# Patient Record
Sex: MALE | Race: WHITE | Employment: FULL TIME | URBAN - METROPOLITAN AREA
[De-identification: names, ages, dates, MRNs, and addresses within clinical notes are randomized per-mention and may not be internally consistent; named-entity substitution may affect disease eponyms.]

---

## 2017-10-27 ENCOUNTER — HOSPITAL ENCOUNTER (OUTPATIENT)
Age: 57
Discharge: HOME | End: 2017-10-27
Payer: COMMERCIAL

## 2017-10-27 LAB
ABSOLUTE EOS #: 0.1 K/UL (ref 0–0.4)
ABSOLUTE IMMATURE GRANULOCYTE: NORMAL K/UL (ref 0–0.3)
ABSOLUTE LYMPH #: 1.3 K/UL (ref 1–4.8)
ABSOLUTE MONO #: 0.6 K/UL (ref 0.1–1.3)
ALBUMIN SERPL-MCNC: 4.8 G/DL (ref 3.5–5.2)
ALBUMIN/GLOBULIN RATIO: NORMAL (ref 1–2.5)
ALP BLD-CCNC: 59 U/L (ref 40–129)
ALT SERPL-CCNC: 34 U/L (ref 5–41)
ANION GAP SERPL CALCULATED.3IONS-SCNC: 13 MMOL/L (ref 9–17)
AST SERPL-CCNC: 26 U/L
BASOPHILS # BLD: 0 %
BASOPHILS ABSOLUTE: 0 K/UL (ref 0–0.2)
BILIRUB SERPL-MCNC: 0.46 MG/DL (ref 0.3–1.2)
BUN BLDV-MCNC: 17 MG/DL (ref 6–20)
BUN/CREAT BLD: NORMAL (ref 9–20)
CALCIUM SERPL-MCNC: 9.1 MG/DL (ref 8.6–10.4)
CARCINOEMBRYONIC ANTIGEN: 1.3 NG/ML
CHLORIDE BLD-SCNC: 100 MMOL/L (ref 98–107)
CO2: 29 MMOL/L (ref 20–31)
CREAT SERPL-MCNC: 1.11 MG/DL (ref 0.7–1.2)
DIFFERENTIAL TYPE: NORMAL
EOSINOPHILS RELATIVE PERCENT: 1 %
GFR AFRICAN AMERICAN: >60 ML/MIN
GFR NON-AFRICAN AMERICAN: >60 ML/MIN
GFR SERPL CREATININE-BSD FRML MDRD: NORMAL ML/MIN/{1.73_M2}
GFR SERPL CREATININE-BSD FRML MDRD: NORMAL ML/MIN/{1.73_M2}
GLUCOSE BLD-MCNC: 96 MG/DL (ref 70–99)
HCT VFR BLD CALC: 44.1 % (ref 41–53)
HEMOGLOBIN: 15.1 G/DL (ref 13.5–17.5)
IMMATURE GRANULOCYTES: NORMAL %
LYMPHOCYTES # BLD: 18 %
MCH RBC QN AUTO: 30.2 PG (ref 26–34)
MCHC RBC AUTO-ENTMCNC: 34.3 G/DL (ref 31–37)
MCV RBC AUTO: 88.1 FL (ref 80–100)
MONOCYTES # BLD: 8 %
PDW BLD-RTO: 13.4 % (ref 11.5–14.9)
PLATELET # BLD: 232 K/UL (ref 150–450)
PLATELET ESTIMATE: NORMAL
PMV BLD AUTO: 10.2 FL (ref 6–12)
POTASSIUM SERPL-SCNC: 4.1 MMOL/L (ref 3.7–5.3)
RBC # BLD: 5.01 M/UL (ref 4.5–5.9)
RBC # BLD: NORMAL 10*6/UL
SEG NEUTROPHILS: 73 %
SEGMENTED NEUTROPHILS ABSOLUTE COUNT: 5.2 K/UL (ref 1.3–9.1)
SODIUM BLD-SCNC: 142 MMOL/L (ref 135–144)
THYROXINE, FREE: 1.17 NG/DL (ref 0.93–1.7)
TOTAL PROTEIN: 7.8 G/DL (ref 6.4–8.3)
TSH SERPL DL<=0.05 MIU/L-ACNC: 1.17 MIU/L (ref 0.3–5)
WBC # BLD: 7.2 K/UL (ref 3.5–11)
WBC # BLD: NORMAL 10*3/UL

## 2017-10-27 PROCEDURE — 80053 COMPREHEN METABOLIC PANEL: CPT

## 2017-10-27 PROCEDURE — 85025 COMPLETE CBC W/AUTO DIFF WBC: CPT

## 2017-10-27 PROCEDURE — 82378 CARCINOEMBRYONIC ANTIGEN: CPT

## 2017-10-27 PROCEDURE — 84443 ASSAY THYROID STIM HORMONE: CPT

## 2017-10-27 PROCEDURE — 36415 COLL VENOUS BLD VENIPUNCTURE: CPT

## 2017-10-27 PROCEDURE — 84439 ASSAY OF FREE THYROXINE: CPT

## 2018-07-17 ENCOUNTER — OFFICE VISIT (OUTPATIENT)
Dept: BARIATRICS/WEIGHT MGMT | Age: 58
End: 2018-07-17
Payer: COMMERCIAL

## 2018-07-17 VITALS
SYSTOLIC BLOOD PRESSURE: 134 MMHG | BODY MASS INDEX: 36.16 KG/M2 | DIASTOLIC BLOOD PRESSURE: 78 MMHG | RESPIRATION RATE: 24 BRPM | HEIGHT: 71 IN | WEIGHT: 258.3 LBS | HEART RATE: 76 BPM

## 2018-07-17 DIAGNOSIS — E11.69 DIABETES MELLITUS TYPE 2 IN OBESE (HCC): Primary | ICD-10-CM

## 2018-07-17 DIAGNOSIS — M19.90 ARTHRITIS: ICD-10-CM

## 2018-07-17 DIAGNOSIS — Z98.61 CAD S/P PERCUTANEOUS CORONARY ANGIOPLASTY: Chronic | ICD-10-CM

## 2018-07-17 DIAGNOSIS — E78.5 HYPERLIPIDEMIA, UNSPECIFIED HYPERLIPIDEMIA TYPE: Chronic | ICD-10-CM

## 2018-07-17 DIAGNOSIS — E66.9 DIABETES MELLITUS TYPE 2 IN OBESE (HCC): Primary | ICD-10-CM

## 2018-07-17 DIAGNOSIS — Z85.038 HISTORY OF COLON CANCER: ICD-10-CM

## 2018-07-17 DIAGNOSIS — E66.9 OBESITY (BMI 30-39.9): ICD-10-CM

## 2018-07-17 DIAGNOSIS — I10 ESSENTIAL HYPERTENSION: Chronic | ICD-10-CM

## 2018-07-17 DIAGNOSIS — I25.10 CAD S/P PERCUTANEOUS CORONARY ANGIOPLASTY: Chronic | ICD-10-CM

## 2018-07-17 PROBLEM — I25.119 CORONARY ARTERY DISEASE INVOLVING NATIVE CORONARY ARTERY OF NATIVE HEART WITH ANGINA PECTORIS (HCC): Status: ACTIVE | Noted: 2018-07-17

## 2018-07-17 PROCEDURE — 99204 OFFICE O/P NEW MOD 45 MIN: CPT | Performed by: NURSE PRACTITIONER

## 2018-07-17 RX ORDER — LORATADINE 10 MG/1
10 CAPSULE, LIQUID FILLED ORAL DAILY
COMMUNITY

## 2018-07-17 NOTE — PROGRESS NOTES
Uric Acid    EKG 12 Lead   2. Essential hypertension  CBC Auto Differential    Comprehensive Metabolic Panel    Hemoglobin A1C    Lipid Panel    TSH without Reflex    Uric Acid    EKG 12 Lead   3. History of colon cancer  CBC Auto Differential    Comprehensive Metabolic Panel    Hemoglobin A1C    Lipid Panel    TSH without Reflex    Uric Acid    EKG 12 Lead   4. Arthritis  CBC Auto Differential    Comprehensive Metabolic Panel    Hemoglobin A1C    Lipid Panel    TSH without Reflex    Uric Acid    EKG 12 Lead   5. Obesity (BMI 30-39. 9)  CBC Auto Differential    Comprehensive Metabolic Panel    Hemoglobin A1C    Lipid Panel    TSH without Reflex    Uric Acid    EKG 12 Lead   6. CAD S/P percutaneous coronary angioplasty  CBC Auto Differential    Comprehensive Metabolic Panel    Hemoglobin A1C    Lipid Panel    TSH without Reflex    Uric Acid    EKG 12 Lead   7. Hyperlipidemia, unspecified hyperlipidemia type  CBC Auto Differential    Comprehensive Metabolic Panel    Hemoglobin A1C    Lipid Panel    TSH without Reflex    Uric Acid    EKG 12 Lead       Plan:      Patient would like to do Decision Free 3+2 diet. Type of shake is . Has mild lactose intolerance and would like to try Lactaid with shakes first and if unsuccessful, will switch to 70 Plus Shakes. Advised patient on consistently getting in minimum food prescription and practicing \"More is Better\" to stay full. Instructed patient to order minimum food prescription weekly. Advised on hydration of 64oz of non-caloric fluid minimum daily. Encouraged plain water and caffeine only in moderation if at all. Avoid alcohol. Level of medical supervision is High Level. Follow-up appts and class schedule reviewed. Consent signed. [x] Order EKG per protocol. [x] Order baseline lab work per protocol. [x] Diabetic teaching done. Low blood sugar protocol reviewed with pt. Follow up:  Return in about 2 weeks (around 7/31/2018).     This encounters orders:  Orders Placed This Encounter   Procedures    CBC Auto Differential     Standing Status:   Future     Standing Expiration Date:   7/17/2019    Comprehensive Metabolic Panel     Standing Status:   Future     Standing Expiration Date:   7/17/2019    Hemoglobin A1C     Standing Status:   Future     Standing Expiration Date:   7/17/2019    Lipid Panel     Standing Status:   Future     Standing Expiration Date:   7/17/2019     Order Specific Question:   Is Patient Fasting?/# of Hours     Answer:   15    TSH without Reflex     Standing Status:   Future     Standing Expiration Date:   7/17/2019    Uric Acid     Standing Status:   Future     Standing Expiration Date:   7/17/2019    EKG 12 Lead     Standing Status:   Future     Standing Expiration Date:   7/17/2019     Order Specific Question:   Reason for Exam?     Answer:   Pre-op       This encounters prescriptions:  No orders of the defined types were placed in this encounter.       Electronically signed by:  Jed Stone CNP

## 2018-07-18 ENCOUNTER — HOSPITAL ENCOUNTER (OUTPATIENT)
Age: 58
Discharge: HOME | End: 2018-07-18
Payer: COMMERCIAL

## 2018-07-18 ENCOUNTER — TELEPHONE (OUTPATIENT)
Dept: BARIATRICS/WEIGHT MGMT | Age: 58
End: 2018-07-18

## 2018-07-18 DIAGNOSIS — I25.10 CAD S/P PERCUTANEOUS CORONARY ANGIOPLASTY: Chronic | ICD-10-CM

## 2018-07-18 DIAGNOSIS — Z85.038 HISTORY OF COLON CANCER: ICD-10-CM

## 2018-07-18 DIAGNOSIS — E11.69 DIABETES MELLITUS TYPE 2 IN OBESE (HCC): ICD-10-CM

## 2018-07-18 DIAGNOSIS — E66.9 OBESITY (BMI 30-39.9): ICD-10-CM

## 2018-07-18 DIAGNOSIS — E66.9 DIABETES MELLITUS TYPE 2 IN OBESE (HCC): ICD-10-CM

## 2018-07-18 DIAGNOSIS — M19.90 ARTHRITIS: ICD-10-CM

## 2018-07-18 DIAGNOSIS — E78.5 HYPERLIPIDEMIA, UNSPECIFIED HYPERLIPIDEMIA TYPE: Chronic | ICD-10-CM

## 2018-07-18 DIAGNOSIS — I10 ESSENTIAL HYPERTENSION: Chronic | ICD-10-CM

## 2018-07-18 DIAGNOSIS — Z98.61 CAD S/P PERCUTANEOUS CORONARY ANGIOPLASTY: Chronic | ICD-10-CM

## 2018-07-18 LAB
ABSOLUTE EOS #: 0.3 K/UL (ref 0–0.4)
ABSOLUTE IMMATURE GRANULOCYTE: ABNORMAL K/UL (ref 0–0.3)
ABSOLUTE LYMPH #: 1.6 K/UL (ref 1–4.8)
ABSOLUTE MONO #: 0.6 K/UL (ref 0.1–1.3)
ALBUMIN SERPL-MCNC: 4.2 G/DL (ref 3.5–5.2)
ALBUMIN/GLOBULIN RATIO: ABNORMAL (ref 1–2.5)
ALP BLD-CCNC: 54 U/L (ref 40–129)
ALT SERPL-CCNC: 47 U/L (ref 5–41)
ANION GAP SERPL CALCULATED.3IONS-SCNC: 11 MMOL/L (ref 9–17)
AST SERPL-CCNC: 28 U/L
BASOPHILS # BLD: 1 % (ref 0–2)
BASOPHILS ABSOLUTE: 0 K/UL (ref 0–0.2)
BILIRUB SERPL-MCNC: 0.31 MG/DL (ref 0.3–1.2)
BUN BLDV-MCNC: 19 MG/DL (ref 6–20)
BUN/CREAT BLD: ABNORMAL (ref 9–20)
CALCIUM SERPL-MCNC: 8.7 MG/DL (ref 8.6–10.4)
CHLORIDE BLD-SCNC: 100 MMOL/L (ref 98–107)
CHOLESTEROL/HDL RATIO: 3.4
CHOLESTEROL: 161 MG/DL
CO2: 29 MMOL/L (ref 20–31)
CREAT SERPL-MCNC: 1.06 MG/DL (ref 0.7–1.2)
DIFFERENTIAL TYPE: ABNORMAL
EKG ATRIAL RATE: 53 BPM
EKG P AXIS: 43 DEGREES
EKG P-R INTERVAL: 174 MS
EKG Q-T INTERVAL: 452 MS
EKG QRS DURATION: 98 MS
EKG QTC CALCULATION (BAZETT): 424 MS
EKG R AXIS: 34 DEGREES
EKG T AXIS: 40 DEGREES
EKG VENTRICULAR RATE: 53 BPM
EOSINOPHILS RELATIVE PERCENT: 5 % (ref 0–4)
ESTIMATED AVERAGE GLUCOSE: 146 MG/DL
GFR AFRICAN AMERICAN: >60 ML/MIN
GFR NON-AFRICAN AMERICAN: >60 ML/MIN
GFR SERPL CREATININE-BSD FRML MDRD: ABNORMAL ML/MIN/{1.73_M2}
GFR SERPL CREATININE-BSD FRML MDRD: ABNORMAL ML/MIN/{1.73_M2}
GLUCOSE BLD-MCNC: 149 MG/DL (ref 70–99)
HBA1C MFR BLD: 6.7 % (ref 4–6)
HCT VFR BLD CALC: 43 % (ref 41–53)
HDLC SERPL-MCNC: 47 MG/DL
HEMOGLOBIN: 14.7 G/DL (ref 13.5–17.5)
IMMATURE GRANULOCYTES: ABNORMAL %
LDL CHOLESTEROL: 95 MG/DL (ref 0–130)
LYMPHOCYTES # BLD: 27 % (ref 24–44)
MCH RBC QN AUTO: 30.2 PG (ref 26–34)
MCHC RBC AUTO-ENTMCNC: 34.2 G/DL (ref 31–37)
MCV RBC AUTO: 88.5 FL (ref 80–100)
MONOCYTES # BLD: 10 % (ref 1–7)
NRBC AUTOMATED: ABNORMAL PER 100 WBC
PDW BLD-RTO: 13.6 % (ref 11.5–14.9)
PLATELET # BLD: 216 K/UL (ref 150–450)
PLATELET ESTIMATE: ABNORMAL
PMV BLD AUTO: 9.4 FL (ref 6–12)
POTASSIUM SERPL-SCNC: 4.5 MMOL/L (ref 3.7–5.3)
RBC # BLD: 4.87 M/UL (ref 4.5–5.9)
RBC # BLD: ABNORMAL 10*6/UL
SEG NEUTROPHILS: 57 % (ref 36–66)
SEGMENTED NEUTROPHILS ABSOLUTE COUNT: 3.5 K/UL (ref 1.3–9.1)
SODIUM BLD-SCNC: 140 MMOL/L (ref 135–144)
TOTAL PROTEIN: 6.8 G/DL (ref 6.4–8.3)
TRIGL SERPL-MCNC: 96 MG/DL
TSH SERPL DL<=0.05 MIU/L-ACNC: 2.16 MIU/L (ref 0.3–5)
URIC ACID: 6.8 MG/DL (ref 3.4–7)
VLDLC SERPL CALC-MCNC: NORMAL MG/DL (ref 1–30)
WBC # BLD: 5.9 K/UL (ref 3.5–11)
WBC # BLD: ABNORMAL 10*3/UL

## 2018-07-18 PROCEDURE — 84550 ASSAY OF BLOOD/URIC ACID: CPT

## 2018-07-18 PROCEDURE — 93005 ELECTROCARDIOGRAM TRACING: CPT

## 2018-07-18 PROCEDURE — 85025 COMPLETE CBC W/AUTO DIFF WBC: CPT

## 2018-07-18 PROCEDURE — 83036 HEMOGLOBIN GLYCOSYLATED A1C: CPT

## 2018-07-18 PROCEDURE — 80061 LIPID PANEL: CPT

## 2018-07-18 PROCEDURE — 36415 COLL VENOUS BLD VENIPUNCTURE: CPT

## 2018-07-18 PROCEDURE — 84443 ASSAY THYROID STIM HORMONE: CPT

## 2018-07-18 PROCEDURE — 80053 COMPREHEN METABOLIC PANEL: CPT

## 2018-08-01 ENCOUNTER — OFFICE VISIT (OUTPATIENT)
Dept: BARIATRICS/WEIGHT MGMT | Age: 58
End: 2018-08-01
Payer: COMMERCIAL

## 2018-08-01 VITALS
SYSTOLIC BLOOD PRESSURE: 126 MMHG | RESPIRATION RATE: 22 BRPM | BODY MASS INDEX: 33.88 KG/M2 | DIASTOLIC BLOOD PRESSURE: 74 MMHG | WEIGHT: 242 LBS | HEART RATE: 72 BPM | HEIGHT: 71 IN

## 2018-08-01 DIAGNOSIS — E78.5 HYPERLIPIDEMIA, UNSPECIFIED HYPERLIPIDEMIA TYPE: Chronic | ICD-10-CM

## 2018-08-01 DIAGNOSIS — M19.90 ARTHRITIS: ICD-10-CM

## 2018-08-01 DIAGNOSIS — Z98.61 CAD S/P PERCUTANEOUS CORONARY ANGIOPLASTY: Chronic | ICD-10-CM

## 2018-08-01 DIAGNOSIS — E11.69 DIABETES MELLITUS TYPE 2 IN OBESE (HCC): Primary | ICD-10-CM

## 2018-08-01 DIAGNOSIS — E66.9 OBESITY (BMI 30-39.9): ICD-10-CM

## 2018-08-01 DIAGNOSIS — E66.9 DIABETES MELLITUS TYPE 2 IN OBESE (HCC): Primary | ICD-10-CM

## 2018-08-01 DIAGNOSIS — I25.10 CAD S/P PERCUTANEOUS CORONARY ANGIOPLASTY: Chronic | ICD-10-CM

## 2018-08-01 DIAGNOSIS — I10 ESSENTIAL HYPERTENSION: Chronic | ICD-10-CM

## 2018-08-01 PROCEDURE — 99213 OFFICE O/P EST LOW 20 MIN: CPT | Performed by: NURSE PRACTITIONER

## 2018-08-01 RX ORDER — POTASSIUM CHLORIDE 750 MG/1
10 TABLET, EXTENDED RELEASE ORAL DAILY
Qty: 30 TABLET | Refills: 0 | Status: ON HOLD | OUTPATIENT
Start: 2018-08-01 | End: 2018-10-18 | Stop reason: ALTCHOICE

## 2018-08-01 NOTE — PROGRESS NOTES
Medically Supervised Weight Loss Follow-up Progress Note      Subjective     Patient is here for weight management program to follow-up for the chronic conditions of Type 2 DM, CAD, HLD, HTN, Arthritis . Patient was on Decision Free 3+2 diet plan, but since he was bothered by lactose intolerance, he switched himself to 70 Plus and purchased these shakes today. Consistently getting in minimum food script and fluids and HMR multivitamin daily. Total weight loss to date is 16 lbs. He had some dizziness, so he thought it might be from metformin and took himself off of it. Continues with antihypertensives. Allergies: Allergies   Allergen Reactions    Iodine Rash     SEVERE ITCHING AND RASH       Past Medical History:     Past Medical History:   Diagnosis Date    Arthritis     CAD (coronary artery disease)     Diabetes mellitus ([facility] [address]     H/O colon cancer, stage I     Hyperlipidemia     Hyperplastic polyp of intestine     Hypertension     Pneumonia     Polyp of colon, villous adenoma     WITH HIGH GRADE DYSPLASIA     Tubular adenoma    . Past Surgical History:  Past Surgical History:   Procedure Laterality Date    COLECTOMY  4/21/15    LOWER ANTERIOR RESECTION, INTRAOP.  COLONOSCOPY, DIVERTING LOOP COLOSTOMY    COLONOSCOPY  3-24-15    VILLOUS ADENOMA WITH FOCAL HIGH GRADE DYSPLASIA, TUBULAR ADENOMA AND HYPERPLASTIC POLYP     COLONOSCOPY  6/23/15    BENIGN SMALL INTESTINE AND CONTIGUOUS SKIN, CONSISTENT WITH ILEOTOMY     COLONOSCOPY  03/17/16    DIVERTICULOSIS, HEMORRHOIDS     CORONARY ANGIOPLASTY WITH STENT PLACEMENT  2004 2009 2013     TOTAL OF 10 STENTS    OTHER SURGICAL HISTORY Left 2 YRS AGO    SHOULDER SURGERY    OTHER SURGICAL HISTORY  6/30/15    ileostomy reversal    SIGMOIDOSCOPY  4/16/15    tatooing    TESTICLE SURGERY Right     TIBIA FRACTURE SURGERY Right 1975    & FIB    TONSILLECTOMY      VASECTOMY  25YRS AGO       Family History:  Family History   Problem Relation Age of Onset    Heart Attack Father     Hypertension Father     High Cholesterol Father     Hypertension Brother     Heart Attack Brother     Cancer Brother         skin cancer    Heart Attack Brother     Heart Attack Paternal Grandfather     Heart Attack Maternal Grandfather        Social History:  Social History     Social History    Marital status:      Spouse name: N/A    Number of children: N/A    Years of education: N/A     Occupational History    Not on file. Social History Main Topics    Smoking status: Former Smoker     Quit date: 4/17/2005    Smokeless tobacco: Never Used    Alcohol use Yes      Comment: RARE    Drug use: No    Sexual activity: Not on file     Other Topics Concern    Not on file     Social History Narrative    No narrative on file       Current Medications:  Current Outpatient Prescriptions   Medication Sig Dispense Refill    potassium chloride (KLOR-CON M) 10 MEQ extended release tablet Take 1 tablet by mouth daily 30 tablet 0    loratadine (CLARITIN) 10 MG capsule Take 10 mg by mouth daily      ZETIA 10 MG tablet       NITROSTAT 0.4 MG SL tablet       rosuvastatin (CRESTOR) 40 MG tablet Take 40 mg by mouth every evening.  metoprolol (TOPROL-XL) 50 MG XL tablet Take 50 mg by mouth daily.  lisinopril (PRINIVIL;ZESTRIL) 5 MG tablet Take 5 mg by mouth daily.  aspirin 81 MG tablet Take 81 mg by mouth daily.  metFORMIN (GLUCOPHAGE) 500 MG tablet TAKE 1 TABLET BY MOUTH TWO TIMES A DAY  1     No current facility-administered medications for this visit. Vital Signs:  /74 (Site: Right Arm, Position: Sitting, Cuff Size: Large Adult)   Pulse 72   Resp 22   Ht 5' 10.98\" (1.803 m)   Wt 242 lb (109.8 kg)   BMI 33.77 kg/m²     BMI/Height/Weight:  Body mass index is 33.77 kg/m². Review of Systems - Review of systems was performed. All were negative unless otherwise documented in HPI.     Constitutional: Negative for fever, chills and diaphoresis. HENT: Negative for hearing loss and trouble swallowing. Eyes: Negative for photophobia and visual disturbance. Respiratory: Negative for cough, shortness of breath and wheezing. Cardiovascular: Negative for chest pain and palpitations. Gastrointestinal: Negative for nausea, vomiting, abdominal pain, diarrhea, constipation, blood in stool and abdominal distention. Endocrine: Negative for polydipsia, polyphagia and polyuria. Genitourinary: Negative for dysuria, frequency, hematuria and difficulty urinating. Musculoskeletal: Negative for myalgias, joint swelling. Skin: Negative for pallor and rash. Neurological: Negative for dizziness, tremors, light-headedness and headaches. Psychiatric/Behavioral: Negative for sleep disturbance and dysphoric mood. Objective:      Physical Exam   Vital signs reviewed. General: Well-developed and well-nourished. No acute distress. Skin: Warm, dry and intact. HEENT: Normocephalic. EOMs intact. Conjunctivae normal. Neck supple. Cardiovascular: Normal rate, regular rhythm. No murmur. Pulmonary/Chest: Normal effort. Lungs clear to auscultation. No rales, rhonchi or wheezing. Abdominal: Positive bowel sounds. Soft, nontender. Nondistended. Musculoskeletal: Movement x4. No edema. Neurological: Gait normal. Alert and oriented to person, place, and time. Psychiatric: Normal mood and affect. Speech and behavior normal. Judgment and thought content normal. Cognition and memory intact. Assessment:       Diagnosis Orders   1. Diabetes mellitus type 2 in obese [facility])  Comprehensive Metabolic Panel    potassium chloride (KLOR-CON M) 10 MEQ extended release tablet   2. Hyperlipidemia, unspecified hyperlipidemia type  Comprehensive Metabolic Panel    potassium chloride (KLOR-CON M) 10 MEQ extended release tablet   3. Essential hypertension  Comprehensive Metabolic Panel    potassium chloride (KLOR-CON M) 10 MEQ extended release tablet   4. Arthritis  Comprehensive Metabolic Panel    potassium chloride (KLOR-CON M) 10 MEQ extended release tablet   5. Obesity (BMI 30-39. 9)  Comprehensive Metabolic Panel    potassium chloride (KLOR-CON M) 10 MEQ extended release tablet   6. CAD S/P percutaneous coronary angioplasty         Plan:      Vital signs reviewed      Plan:  May switch to 70 Plus. Reviewed new minimum prescription and potassium supplement discussed and prescribed. Lab work per [facility] protocol. Advised patient to be fasting since he does not test his blood sugars and took himself off metformin. Return to clinic as scheduled per [facility] protocol. Follow up:  Return in about 6 days (around 8/7/2018).     This encounters orders:  Orders Placed This Encounter   Procedures    Comprehensive Metabolic Panel     Standing Status:   Future     Standing Expiration Date:   8/1/2019       This encounters prescriptions:  Orders Placed This Encounter   Medications    potassium chloride (KLOR-CON M) 10 MEQ extended release tablet     Sig: Take 1 tablet by mouth daily     Dispense:  30 tablet     Refill:  0       Electronically signed by:  Radha Cooley CNP

## 2018-08-03 ENCOUNTER — HOSPITAL ENCOUNTER (OUTPATIENT)
Age: 58
Discharge: HOME | End: 2018-08-03
Payer: COMMERCIAL

## 2018-08-03 DIAGNOSIS — M19.90 ARTHRITIS: ICD-10-CM

## 2018-08-03 DIAGNOSIS — E78.5 HYPERLIPIDEMIA, UNSPECIFIED HYPERLIPIDEMIA TYPE: Chronic | ICD-10-CM

## 2018-08-03 DIAGNOSIS — I10 ESSENTIAL HYPERTENSION: Chronic | ICD-10-CM

## 2018-08-03 DIAGNOSIS — E66.9 DIABETES MELLITUS TYPE 2 IN OBESE (HCC): ICD-10-CM

## 2018-08-03 DIAGNOSIS — E11.69 DIABETES MELLITUS TYPE 2 IN OBESE (HCC): ICD-10-CM

## 2018-08-03 DIAGNOSIS — E66.9 OBESITY (BMI 30-39.9): ICD-10-CM

## 2018-08-03 LAB
ALBUMIN SERPL-MCNC: 4.3 G/DL (ref 3.5–5.2)
ALBUMIN/GLOBULIN RATIO: ABNORMAL (ref 1–2.5)
ALP BLD-CCNC: 62 U/L (ref 40–129)
ALT SERPL-CCNC: 39 U/L (ref 5–41)
ANION GAP SERPL CALCULATED.3IONS-SCNC: 12 MMOL/L (ref 9–17)
AST SERPL-CCNC: 32 U/L
BILIRUB SERPL-MCNC: 0.44 MG/DL (ref 0.3–1.2)
BUN BLDV-MCNC: 16 MG/DL (ref 6–20)
BUN/CREAT BLD: ABNORMAL (ref 9–20)
CALCIUM SERPL-MCNC: 9.1 MG/DL (ref 8.6–10.4)
CHLORIDE BLD-SCNC: 102 MMOL/L (ref 98–107)
CO2: 27 MMOL/L (ref 20–31)
CREAT SERPL-MCNC: 1.1 MG/DL (ref 0.7–1.2)
GFR AFRICAN AMERICAN: >60 ML/MIN
GFR NON-AFRICAN AMERICAN: >60 ML/MIN
GFR SERPL CREATININE-BSD FRML MDRD: ABNORMAL ML/MIN/{1.73_M2}
GFR SERPL CREATININE-BSD FRML MDRD: ABNORMAL ML/MIN/{1.73_M2}
GLUCOSE BLD-MCNC: 121 MG/DL (ref 70–99)
POTASSIUM SERPL-SCNC: 4.3 MMOL/L (ref 3.7–5.3)
SODIUM BLD-SCNC: 141 MMOL/L (ref 135–144)
TOTAL PROTEIN: 7.2 G/DL (ref 6.4–8.3)

## 2018-08-03 PROCEDURE — 36415 COLL VENOUS BLD VENIPUNCTURE: CPT

## 2018-08-03 PROCEDURE — 80053 COMPREHEN METABOLIC PANEL: CPT

## 2018-08-07 ENCOUNTER — OFFICE VISIT (OUTPATIENT)
Dept: BARIATRICS/WEIGHT MGMT | Age: 58
End: 2018-08-07
Payer: COMMERCIAL

## 2018-08-07 VITALS
WEIGHT: 242.8 LBS | RESPIRATION RATE: 20 BRPM | DIASTOLIC BLOOD PRESSURE: 72 MMHG | HEART RATE: 74 BPM | SYSTOLIC BLOOD PRESSURE: 128 MMHG | HEIGHT: 71 IN | BODY MASS INDEX: 33.99 KG/M2

## 2018-08-07 DIAGNOSIS — I10 ESSENTIAL HYPERTENSION: Chronic | ICD-10-CM

## 2018-08-07 DIAGNOSIS — E78.5 HYPERLIPIDEMIA, UNSPECIFIED HYPERLIPIDEMIA TYPE: Chronic | ICD-10-CM

## 2018-08-07 DIAGNOSIS — E66.9 DIABETES MELLITUS TYPE 2 IN OBESE (HCC): Primary | ICD-10-CM

## 2018-08-07 DIAGNOSIS — Z85.038 HISTORY OF COLON CANCER: ICD-10-CM

## 2018-08-07 DIAGNOSIS — E66.9 OBESITY (BMI 30-39.9): ICD-10-CM

## 2018-08-07 DIAGNOSIS — E11.69 DIABETES MELLITUS TYPE 2 IN OBESE (HCC): Primary | ICD-10-CM

## 2018-08-07 DIAGNOSIS — M19.90 ARTHRITIS: ICD-10-CM

## 2018-08-07 PROCEDURE — 99213 OFFICE O/P EST LOW 20 MIN: CPT | Performed by: NURSE PRACTITIONER

## 2018-08-07 NOTE — PROGRESS NOTES
Relation Age of Onset    Heart Attack Father     Hypertension Father     High Cholesterol Father     Hypertension Brother     Heart Attack Brother     Cancer Brother         skin cancer    Heart Attack Brother     Heart Attack Paternal Grandfather     Heart Attack Maternal Grandfather        Social History:  Social History     Social History    Marital status:      Spouse name: N/A    Number of children: N/A    Years of education: N/A     Occupational History    Not on file. Social History Main Topics    Smoking status: Former Smoker     Quit date: 4/17/2005    Smokeless tobacco: Never Used    Alcohol use Yes      Comment: RARE    Drug use: No    Sexual activity: Not on file     Other Topics Concern    Not on file     Social History Narrative    No narrative on file       Current Medications:  Current Outpatient Prescriptions   Medication Sig Dispense Refill    potassium chloride (KLOR-CON M) 10 MEQ extended release tablet Take 1 tablet by mouth daily 30 tablet 0    metFORMIN (GLUCOPHAGE) 500 MG tablet TAKE 1 TABLET BY MOUTH TWO TIMES A DAY  1    ZETIA 10 MG tablet       NITROSTAT 0.4 MG SL tablet       rosuvastatin (CRESTOR) 40 MG tablet Take 40 mg by mouth every evening.  metoprolol (TOPROL-XL) 50 MG XL tablet Take 50 mg by mouth daily.  lisinopril (PRINIVIL;ZESTRIL) 5 MG tablet Take 5 mg by mouth daily.  aspirin 81 MG tablet Take 81 mg by mouth daily.  loratadine (CLARITIN) 10 MG capsule Take 10 mg by mouth daily       No current facility-administered medications for this visit. Vital Signs:  /72 (Site: Left Arm, Position: Sitting, Cuff Size: Large Adult)   Pulse 74   Resp 20   Ht 5' 10.98\" (1.803 m)   Wt 242 lb 12.8 oz (110.1 kg)   BMI 33.88 kg/m²     BMI/Height/Weight:  Body mass index is 33.88 kg/m². Review of Systems - Review of systems was performed. All were negative unless otherwise documented in HPI.     Constitutional: Negative for fever, chills and diaphoresis. HENT: Negative for hearing loss and trouble swallowing. Eyes: Negative for photophobia and visual disturbance. Respiratory: Negative for cough, shortness of breath and wheezing. Cardiovascular: Negative for chest pain and palpitations. Gastrointestinal: Negative for nausea, vomiting, abdominal pain, diarrhea, constipation, blood in stool and abdominal distention. Endocrine: Negative for polydipsia, polyphagia and polyuria. Genitourinary: Negative for dysuria, frequency, hematuria and difficulty urinating. Musculoskeletal: Negative for myalgias, joint swelling. Skin: Negative for pallor and rash. Neurological: Negative for dizziness, tremors, light-headedness and headaches. Psychiatric/Behavioral: Negative for sleep disturbance and dysphoric mood. Objective:      Physical Exam   Vital signs reviewed. General: Well-developed and well-nourished. No acute distress. Skin: Warm, dry and intact. HEENT: Normocephalic. EOMs intact. Conjunctivae normal. Neck supple. Cardiovascular: Normal rate, regular rhythm. No murmur. Pulmonary/Chest: Normal effort. Lungs clear to auscultation. No rales, rhonchi or wheezing. Abdominal: Positive bowel sounds. Soft, nontender. Nondistended. Musculoskeletal: Movement x4. No edema. Neurological: Gait normal. Alert and oriented to person, place, and time. Psychiatric: Normal mood and affect. Speech and behavior normal. Judgment and thought content normal. Cognition and memory intact. Assessment:       Diagnosis Orders   1. Diabetes mellitus type 2 in obese ([address]     2. Essential hypertension     3. Hyperlipidemia, unspecified hyperlipidemia type     4. Arthritis     5. Obesity (BMI 30-39.9)     6. History of colon cancer         Plan:      Vital signs reviewed. B/P normal today. Labs reviewed and discussed with patient. . Potassium 4.3. Plan:  Continue current regimen.   Continue potassium

## 2018-08-29 ENCOUNTER — OFFICE VISIT (OUTPATIENT)
Dept: BARIATRICS/WEIGHT MGMT | Age: 58
End: 2018-08-29
Payer: COMMERCIAL

## 2018-08-29 VITALS
SYSTOLIC BLOOD PRESSURE: 104 MMHG | DIASTOLIC BLOOD PRESSURE: 70 MMHG | WEIGHT: 232.1 LBS | HEART RATE: 68 BPM | BODY MASS INDEX: 32.49 KG/M2 | HEIGHT: 71 IN

## 2018-08-29 DIAGNOSIS — E66.9 OBESITY (BMI 30-39.9): ICD-10-CM

## 2018-08-29 DIAGNOSIS — Z98.61 CAD S/P PERCUTANEOUS CORONARY ANGIOPLASTY: Chronic | ICD-10-CM

## 2018-08-29 DIAGNOSIS — I10 ESSENTIAL HYPERTENSION: Chronic | ICD-10-CM

## 2018-08-29 DIAGNOSIS — E66.9 DIABETES MELLITUS TYPE 2 IN OBESE (HCC): Primary | ICD-10-CM

## 2018-08-29 DIAGNOSIS — E78.5 HYPERLIPIDEMIA, UNSPECIFIED HYPERLIPIDEMIA TYPE: Chronic | ICD-10-CM

## 2018-08-29 DIAGNOSIS — E11.69 DIABETES MELLITUS TYPE 2 IN OBESE (HCC): Primary | ICD-10-CM

## 2018-08-29 DIAGNOSIS — M19.90 ARTHRITIS: ICD-10-CM

## 2018-08-29 DIAGNOSIS — Z85.038 HISTORY OF COLON CANCER: ICD-10-CM

## 2018-08-29 DIAGNOSIS — I25.10 CAD S/P PERCUTANEOUS CORONARY ANGIOPLASTY: Chronic | ICD-10-CM

## 2018-08-29 PROCEDURE — 99213 OFFICE O/P EST LOW 20 MIN: CPT | Performed by: NURSE PRACTITIONER

## 2018-08-29 NOTE — PROGRESS NOTES
Medically Supervised Weight Loss Follow-up Progress Note      Subjective     Patient is here for weight management program to follow-up for the chronic conditions of Type 2 DM, CAD, HLD, HTN, Arthritis . Patient switched to 70 Plus 5+2 diet plan due to lactose intolerance and tolerating well. Consistently getting in minimum food script and fluids and HMR multivitamin daily. Physical activity includes biking and running. Total weight loss to date is 26 lbs. Denies dizziness or any other concerns. He wants to drop from the program and pursue alternative weight management method due to difficulty making his medical and class appts. Allergies: Allergies   Allergen Reactions    Iodine Rash     SEVERE ITCHING AND RASH       Past Medical History:     Past Medical History:   Diagnosis Date    Arthritis     CAD (coronary artery disease)     Diabetes mellitus ([facility] [address]     H/O colon cancer, stage I     Hyperlipidemia     Hyperplastic polyp of intestine     Hypertension     Pneumonia     Polyp of colon, villous adenoma     WITH HIGH GRADE DYSPLASIA     Tubular adenoma    . Past Surgical History:  Past Surgical History:   Procedure Laterality Date    COLECTOMY  4/21/15    LOWER ANTERIOR RESECTION, INTRAOP.  COLONOSCOPY, DIVERTING LOOP COLOSTOMY    COLONOSCOPY  3-24-15    VILLOUS ADENOMA WITH FOCAL HIGH GRADE DYSPLASIA, TUBULAR ADENOMA AND HYPERPLASTIC POLYP     COLONOSCOPY  6/23/15    BENIGN SMALL INTESTINE AND CONTIGUOUS SKIN, CONSISTENT WITH ILEOTOMY     COLONOSCOPY  03/17/16    DIVERTICULOSIS, HEMORRHOIDS     CORONARY ANGIOPLASTY WITH STENT PLACEMENT  2004 2009 2013     TOTAL OF 10 STENTS    OTHER SURGICAL HISTORY Left 2 YRS AGO    SHOULDER SURGERY    OTHER SURGICAL HISTORY  6/30/15    ileostomy reversal    SIGMOIDOSCOPY  4/16/15    tatooing    TESTICLE SURGERY Right     TIBIA FRACTURE SURGERY Right 1975    & FIB    TONSILLECTOMY      VASECTOMY  25YRS AGO       Family History:  Family

## 2018-10-08 ENCOUNTER — TELEPHONE (OUTPATIENT)
Dept: GASTROENTEROLOGY | Age: 58
End: 2018-10-08

## 2018-10-10 DIAGNOSIS — Z86.010 HISTORY OF COLON POLYPS: Primary | ICD-10-CM

## 2018-10-10 NOTE — TELEPHONE ENCOUNTER
Spoke with patient, he is now scheduled for colon recall.      [facility] [email or web address] colon [email or web address]

## 2018-10-12 RX ORDER — SODIUM, POTASSIUM,MAG SULFATES 17.5-3.13G
SOLUTION, RECONSTITUTED, ORAL ORAL
Qty: 1 BOTTLE | Refills: 0 | Status: SHIPPED | OUTPATIENT
Start: 2018-10-12 | End: 2022-09-15 | Stop reason: ALTCHOICE

## 2018-10-15 ENCOUNTER — TELEPHONE (OUTPATIENT)
Dept: GASTROENTEROLOGY | Age: 58
End: 2018-10-15

## 2018-10-18 ENCOUNTER — HOSPITAL ENCOUNTER (OUTPATIENT)
Age: 58
Setting detail: OUTPATIENT SURGERY
Discharge: HOME | End: 2018-10-18
Attending: INTERNAL MEDICINE | Admitting: INTERNAL MEDICINE
Payer: COMMERCIAL

## 2018-10-18 VITALS
RESPIRATION RATE: 16 BRPM | DIASTOLIC BLOOD PRESSURE: 80 MMHG | OXYGEN SATURATION: 96 % | WEIGHT: 230 LBS | TEMPERATURE: 97.9 F | BODY MASS INDEX: 32.2 KG/M2 | SYSTOLIC BLOOD PRESSURE: 135 MMHG | HEIGHT: 71 IN | HEART RATE: 66 BPM

## 2018-10-18 LAB — GLUCOSE BLD-MCNC: 113 MG/DL (ref 75–110)

## 2018-10-18 PROCEDURE — 82947 ASSAY GLUCOSE BLOOD QUANT: CPT

## 2018-10-18 PROCEDURE — 7100000010 HC PHASE II RECOVERY - FIRST 15 MIN: Performed by: INTERNAL MEDICINE

## 2018-10-18 PROCEDURE — 99153 MOD SED SAME PHYS/QHP EA: CPT | Performed by: INTERNAL MEDICINE

## 2018-10-18 PROCEDURE — 99152 MOD SED SAME PHYS/QHP 5/>YRS: CPT | Performed by: INTERNAL MEDICINE

## 2018-10-18 PROCEDURE — 3609027000 HC COLONOSCOPY: Performed by: INTERNAL MEDICINE

## 2018-10-18 PROCEDURE — 45378 DIAGNOSTIC COLONOSCOPY: CPT | Performed by: INTERNAL MEDICINE

## 2018-10-18 PROCEDURE — 7100000011 HC PHASE II RECOVERY - ADDTL 15 MIN: Performed by: INTERNAL MEDICINE

## 2018-10-18 PROCEDURE — 2709999900 HC NON-CHARGEABLE SUPPLY: Performed by: INTERNAL MEDICINE

## 2018-10-18 PROCEDURE — 6360000002 HC RX W HCPCS: Performed by: INTERNAL MEDICINE

## 2018-10-18 PROCEDURE — 2580000003 HC RX 258: Performed by: INTERNAL MEDICINE

## 2018-10-18 RX ORDER — MIDAZOLAM HYDROCHLORIDE 1 MG/ML
INJECTION INTRAMUSCULAR; INTRAVENOUS PRN
Status: DISCONTINUED | OUTPATIENT
Start: 2018-10-18 | End: 2018-10-18 | Stop reason: HOSPADM

## 2018-10-18 RX ORDER — SODIUM CHLORIDE, SODIUM LACTATE, POTASSIUM CHLORIDE, CALCIUM CHLORIDE 600; 310; 30; 20 MG/100ML; MG/100ML; MG/100ML; MG/100ML
INJECTION, SOLUTION INTRAVENOUS CONTINUOUS
Status: DISCONTINUED | OUTPATIENT
Start: 2018-10-18 | End: 2018-10-18 | Stop reason: HOSPADM

## 2018-10-18 RX ORDER — FENTANYL CITRATE 50 UG/ML
INJECTION, SOLUTION INTRAMUSCULAR; INTRAVENOUS PRN
Status: DISCONTINUED | OUTPATIENT
Start: 2018-10-18 | End: 2018-10-18 | Stop reason: HOSPADM

## 2018-10-18 RX ADMIN — SODIUM CHLORIDE, POTASSIUM CHLORIDE, SODIUM LACTATE AND CALCIUM CHLORIDE: 600; 310; 30; 20 INJECTION, SOLUTION INTRAVENOUS at 07:26

## 2018-10-18 ASSESSMENT — PAIN SCALES - GENERAL
PAINLEVEL_OUTOF10: 0

## 2018-10-18 ASSESSMENT — PAIN - FUNCTIONAL ASSESSMENT: PAIN_FUNCTIONAL_ASSESSMENT: 0-10

## 2018-10-18 NOTE — PRE SEDATION
Pre Sedation Note    1. Patient is a 66 y.o. male with above specified procedure planned. Expected Sedation/Anesthesia Type: Moderate Sedation    2. [facility]SA ([address] Anesthesiology) Anesthesia Status: Class 1 - A normal healthy patient    3. Mallampati: I (soft palate, uvula, fauces, tonsillar pillars visible)  4. Procedure options, risks and benefits reviewed with Patient. Patient expresses understanding. 5.  Consent has been signed:  Yes    HISTORY OF PRESENT ILLNESS:       The patient is a 66 y.o. male who presents for the above procedure. Past Medical History:    Past Medical History:   Diagnosis Date    Arthritis     CAD (coronary artery disease)     Diabetes mellitus ([facility] [address]     H/O colon cancer, stage I     Hyperlipidemia     Hyperplastic polyp of intestine     Hypertension     Pneumonia     Polyp of colon, villous adenoma     WITH HIGH GRADE DYSPLASIA     Tubular adenoma        Past Surgical History:    Past Surgical History:   Procedure Laterality Date    COLECTOMY  4/21/15    LOWER ANTERIOR RESECTION, INTRAOP.  COLONOSCOPY, DIVERTING LOOP COLOSTOMY    COLONOSCOPY  3-24-15    VILLOUS ADENOMA WITH FOCAL HIGH GRADE DYSPLASIA, TUBULAR ADENOMA AND HYPERPLASTIC POLYP     COLONOSCOPY  6/23/15    BENIGN SMALL INTESTINE AND CONTIGUOUS SKIN, CONSISTENT WITH ILEOTOMY     COLONOSCOPY  03/17/16    DIVERTICULOSIS, HEMORRHOIDS     CORONARY ANGIOPLASTY WITH STENT PLACEMENT  2004 2009 2013     TOTAL OF 10 STENTS    OTHER SURGICAL HISTORY Left 2 YRS AGO    SHOULDER SURGERY    OTHER SURGICAL HISTORY  6/30/15    ileostomy reversal    SIGMOIDOSCOPY  4/16/15    tatooing    TESTICLE SURGERY Right     TIBIA FRACTURE SURGERY Right 1975    & FIB    TONSILLECTOMY      VASECTOMY  25YRS AGO       Medications:  Current Facility-Administered Medications   Medication Dose Route Frequency Provider Last Rate Last Dose    lactated ringers infusion   Intravenous Continuous Miguel Hawkins  mL/hr at 10/18/18 0726         Allergies: Allergies   Allergen Reactions    Iodine Rash     SEVERE ITCHING AND RASH                Family History  Family Status   Relation Status    Father (Not Specified)    Brother (Not Specified)    Brother (Not Specified)    PGF (Not Specified)    MGF (Not Specified)     family history includes Cancer in his brother; Heart Attack in his brother, brother, father, maternal grandfather, and paternal grandfather; High Cholesterol in his father; Hypertension in his brother and father. Social History   reports that he quit smoking about 13 years ago. He has never used smokeless tobacco.   reports that he drinks alcohol. reports that he does not use drugs. Problems with Sedation/Anesthesia in the past? no    REVIEW OF SYSTEMS:  12 point review of systems negative other than mentioned above.       PHYSICAL EXAM:    Vitals:  BP (!) 152/82   Pulse 67   Temp 97.2 °F (36.2 °C) (Oral)   Resp 18   Ht 5' 11\" (1.803 m)   Wt 230 lb (104.3 kg)   SpO2 98%   BMI 32.08 kg/m²     Focused Exam related to procedure:    General appearance: NAD, conversant   Eyes: anicteric sclerae, moist conjunctivae; no lid-lag; PERRLA   Lungs: CTA, with normal respiratory effort and no intercostal retractions   CV: RRR, no MRGs   Abdomen: Soft, non-tender; no masses or HSM   Skin: Normal temperature, turgor and texture; no rash, ulcers or subcutaneous nodules     DATA:  CBC:   Lab Results   Component Value Date    WBC 5.9 07/18/2018    HGB 14.7 07/18/2018    HCT 43.0 07/18/2018    MCV 88.5 07/18/2018     07/18/2018     BUN/Cr:   Lab Results   Component Value Date    BUN 16 08/03/2018   ,   Lab Results   Component Value Date    CREATININE 1.10 08/03/2018     Potassium:   Lab Results   Component Value Date    K 4.3 08/03/2018     PT/INR: No results found for: INR, PROTIME        Isam Daboul

## 2018-11-20 ENCOUNTER — TELEPHONE (OUTPATIENT)
Dept: GASTROENTEROLOGY | Age: 58
End: 2018-11-20

## 2018-11-21 NOTE — TELEPHONE ENCOUNTER
Spoke with pt, he will have his employer fax FMLA to office. Informed pt it will next week before they are ready with the holiday.  He understood

## 2018-12-04 ENCOUNTER — TELEPHONE (OUTPATIENT)
Dept: GASTROENTEROLOGY | Age: 58
End: 2018-12-04

## 2019-09-06 DIAGNOSIS — C18.9 MALIGNANT NEOPLASM OF COLON, UNSPECIFIED PART OF COLON (HCC): ICD-10-CM

## 2022-05-06 ENCOUNTER — OFFICE VISIT (OUTPATIENT)
Dept: GASTROENTEROLOGY | Age: 62
End: 2022-05-06
Payer: COMMERCIAL

## 2022-05-06 VITALS
BODY MASS INDEX: 35.15 KG/M2 | WEIGHT: 252 LBS | SYSTOLIC BLOOD PRESSURE: 123 MMHG | HEART RATE: 85 BPM | DIASTOLIC BLOOD PRESSURE: 77 MMHG

## 2022-05-06 DIAGNOSIS — D12.5 ADENOMATOUS POLYP OF SIGMOID COLON: ICD-10-CM

## 2022-05-06 DIAGNOSIS — C18.6 MALIGNANT NEOPLASM OF DESCENDING COLON (HCC): Primary | ICD-10-CM

## 2022-05-06 DIAGNOSIS — K57.90 DIVERTICULOSIS: ICD-10-CM

## 2022-05-06 PROCEDURE — 99214 OFFICE O/P EST MOD 30 MIN: CPT | Performed by: INTERNAL MEDICINE

## 2022-05-06 RX ORDER — LOPERAMIDE HYDROCHLORIDE 2 MG/1
2 CAPSULE ORAL DAILY
Qty: 30 CAPSULE | Refills: 3 | Status: SHIPPED | OUTPATIENT
Start: 2022-05-06 | End: 2022-06-05

## 2022-05-06 ASSESSMENT — ENCOUNTER SYMPTOMS
ALLERGIC/IMMUNOLOGIC NEGATIVE: 1
ABDOMINAL DISTENTION: 1
RESPIRATORY NEGATIVE: 1
ABDOMINAL PAIN: 1
EYES NEGATIVE: 1
DIARRHEA: 1
CONSTIPATION: 1

## 2022-05-06 NOTE — PROGRESS NOTES
Reason for Referral:   No referring provider defined for this encounter. Chief Complaint   Patient presents with    Diarrhea     pt states he sometimes has 8-10 loose bm a day, pt states he had colon cancer 7 yrs ago     Constipation     pt states when he is constipated it stays that way until he takes something for it            HISTORY OF PRESENT ILLNESS: Mayra Potts is a 63 y.o. male , referred for evaluation of*hx colon ca , diverticulosis , change in bowel    here for f/u   We have diagnosed him with colon cancer in 2016 his tumor was T1 a N0 M0 did not need any adjuvant therapy treated surgically,  Been having diarrhea chronically his colonoscopy was done just showed diverticulosis  Diarrhea his pain lasts no bleeding no fever no chills no weight loss  Is labs from 2018 were all normal there is no recent labs after that          Past Medical,Family, and Social History reviewed and does contribute to the patient presentingcondition. Patient's PMH/PSH,SH,PSYCH Hx, MEDs, ALLERGIES, and ROS were all reviewed and updated in the appropriate sections. PAST MEDICAL HISTORY:  Past Medical History:   Diagnosis Date    Arthritis     CAD (coronary artery disease)     Diabetes mellitus ([facility] [address]     H/O colon cancer, stage I     Hyperlipidemia     Hyperplastic polyp of intestine     Hypertension     Pneumonia     Polyp of colon, villous adenoma     WITH HIGH GRADE DYSPLASIA     Tubular adenoma        Past Surgical History:   Procedure Laterality Date    COLECTOMY  4/21/15    LOWER ANTERIOR RESECTION, INTRAOP.  COLONOSCOPY, DIVERTING LOOP COLOSTOMY    COLONOSCOPY  3-24-15    VILLOUS ADENOMA WITH FOCAL HIGH GRADE DYSPLASIA, TUBULAR ADENOMA AND HYPERPLASTIC POLYP     COLONOSCOPY  6/23/15    BENIGN SMALL INTESTINE AND CONTIGUOUS SKIN, CONSISTENT WITH ILEOTOMY     COLONOSCOPY  03/17/16    DIVERTICULOSIS, HEMORRHOIDS     CORONARY ANGIOPLASTY WITH STENT PLACEMENT  2004 2009 2013     TOTAL OF 10 STENTS    OTHER SURGICAL HISTORY Left 2 YRS AGO    SHOULDER SURGERY    OTHER SURGICAL HISTORY  6/30/15    ileostomy reversal    VT COLON CA SCRN NOT HI RSK IND N/A 10/18/2018    COLONOSCOPY performed by Honorio Morin MD at [address] [place] [address]  4/16/15    tatooing    TESTICLE SURGERY Right     TIBIA FRACTURE SURGERY Right 1975    & FIB    TONSILLECTOMY      VASECTOMY  25YRS AGO       CURRENT MEDICATIONS:    Current Outpatient Medications:     loperamide (RA ANTI-DIARRHEAL) 2 MG capsule, Take 1 capsule by mouth daily, Disp: 30 capsule, Rfl: 3    Na Sulfate-K Sulfate-Mg Sulf 17.5-3.13-1.6 GM/180ML SOLN, Please follow instructions given by office, Disp: 1 Bottle, Rfl: 0    metFORMIN (GLUCOPHAGE) 500 MG tablet, TAKE 1 TABLET BY MOUTH TWO TIMES A DAY, Disp: , Rfl: 1    loratadine (CLARITIN) 10 MG capsule, Take 10 mg by mouth daily, Disp: , Rfl:     ZETIA 10 MG tablet, , Disp: , Rfl:     NITROSTAT 0.4 MG SL tablet, , Disp: , Rfl:     rosuvastatin (CRESTOR) 40 MG tablet, Take 40 mg by mouth every evening., Disp: , Rfl:     metoprolol (TOPROL-XL) 50 MG XL tablet, Take 50 mg by mouth daily. , Disp: , Rfl:     lisinopril (PRINIVIL;ZESTRIL) 5 MG tablet, Take 5 mg by mouth daily. , Disp: , Rfl:     aspirin 81 MG tablet, Take 81 mg by mouth daily. , Disp: , Rfl:     ALLERGIES:   Allergies   Allergen Reactions    Iodine Rash     SEVERE ITCHING AND RASH       FAMILY HISTORY:       Problem Relation Age of Onset    Heart Attack Father     Hypertension Father     High Cholesterol Father     Hypertension Brother     Heart Attack Brother     Cancer Brother         skin cancer    Heart Attack Brother     Heart Attack Paternal Grandfather     Heart Attack Maternal Grandfather          SOCIAL HISTORY:   Social History     Socioeconomic History    Marital status:      Spouse name: Not on file    Number of children: Not on file    Years of education: Not on file    Highest education level: Not on file Occupational History    Not on file   Tobacco Use    Smoking status: Former Smoker     Quit date: 2005     Years since quittin.0    Smokeless tobacco: Never Used   Substance and Sexual Activity    Alcohol use: Yes     Comment: RARE    Drug use: No    Sexual activity: Not on file   Other Topics Concern    Not on file   Social History Narrative    Not on file     Social Determinants of Health     Financial Resource Strain:     Difficulty of Paying Living Expenses: Not on file   Food Insecurity:     Worried About Running Out of Food in the Last Year: Not on file    Dominic of Food in the Last Year: Not on file   Transportation Needs:     Lack of Transportation (Medical): Not on file    Lack of Transportation (Non-Medical): Not on file   Physical Activity:     Days of Exercise per Week: Not on file    Minutes of Exercise per Session: Not on file   Stress:     Feeling of Stress : Not on file   Social Connections:     Frequency of Communication with Friends and Family: Not on file    Frequency of Social Gatherings with Friends and Family: Not on file    Attends Synagogue Services: Not on file    Active Member of [address] [place] [address] or Organizations: Not on file    Attends Club or Organization Meetings: Not on file    Marital Status: Not on file   Intimate Partner Violence:     Fear of Current or Ex-Partner: Not on file    Emotionally Abused: Not on file    Physically Abused: Not on file    Sexually Abused: Not on file   Housing Stability:     Unable to Pay for Housing in the Last Year: Not on file    Number of [place] in the Last Year: Not on file    Unstable Housing in the Last Year: Not on file       REVIEW OF SYSTEMS: A 12-point review of systemswas obtained and pertinent positives and negatives were enumerated above in the history of present illness. All other reviewed systems / symptoms were negative. Review of Systems   Constitutional: Negative. HENT: Negative.     Eyes: Negative. Respiratory: Negative. Cardiovascular: Negative. Gastrointestinal: Positive for abdominal distention, abdominal pain, constipation and diarrhea. Endocrine: Negative. Genitourinary: Negative. Musculoskeletal: Negative. Skin: Negative. Allergic/Immunologic: Negative. Neurological: Negative. Hematological: Negative. Psychiatric/Behavioral: Negative. LABORATORY DATA: Reviewed  Lab Results   Component Value Date    WBC 5.9 07/18/2018    HGB 14.7 07/18/2018    HCT 43.0 07/18/2018    MCV 88.5 07/18/2018     07/18/2018     08/03/2018    K 4.3 08/03/2018     08/03/2018    CO2 27 08/03/2018    BUN 16 08/03/2018    CREATININE 1.10 08/03/2018    LABALBU 4.3 08/03/2018    BILITOT 0.44 08/03/2018    ALKPHOS 62 08/03/2018    AST 32 08/03/2018    ALT 39 08/03/2018         Lab Results   Component Value Date    RBC 4.87 07/18/2018    HGB 14.7 07/18/2018    MCV 88.5 07/18/2018    MCH 30.2 07/18/2018    MCHC 34.2 07/18/2018    RDW 13.6 07/18/2018    MPV 9.4 07/18/2018    BASOPCT 1 07/18/2018    LYMPHSABS 1.60 07/18/2018    MONOSABS 0.60 07/18/2018    NEUTROABS 3.50 07/18/2018    EOSABS 0.30 07/18/2018    BASOSABS 0.00 07/18/2018         DIAGNOSTIC TESTING:     No results found. /77   Pulse 85   Wt 252 lb (114.3 kg)   BMI 35.15 kg/m²     PHYSICAL EXAMINATION: Vital signs reviewed per the nursing documentation. Body mass index is 35.15 kg/m². Physical Exam  Vitals and nursing note reviewed. Constitutional:       General: He is not in acute distress. Appearance: He is well-developed. He is not diaphoretic. HENT:      Head: Normocephalic and atraumatic. Eyes:      General: No scleral icterus. Pupils: Pupils are equal, round, and reactive to light. Neck:      Thyroid: No thyromegaly. Vascular: No JVD. Trachea: No tracheal deviation. Cardiovascular:      Rate and Rhythm: Normal rate and regular rhythm.       Heart sounds: Normal heart sounds. No murmur heard. Pulmonary:      Effort: Pulmonary effort is normal. No respiratory distress. Breath sounds: Normal breath sounds. No wheezing. Abdominal:      General: Bowel sounds are normal. There is no distension. Palpations: Abdomen is soft. There is no mass. Tenderness: There is no abdominal tenderness. There is no guarding or rebound. Musculoskeletal:         General: No tenderness. Normal range of motion. Cervical back: Normal range of motion and neck supple. Skin:     General: Skin is warm. Coloration: Skin is not pale. Findings: No erythema or rash. Comments: He is not diaphoretic   Neurological:      Mental Status: He is alert and oriented to person, place, and time. Deep Tendon Reflexes: Reflexes are normal and symmetric. Psychiatric:         Behavior: Behavior normal.         Thought Content: Thought content normal.         Judgment: Judgment normal.         IMPRESSION: Mr. Reyes Quan is a 63 y.o. male with      Diagnosis Orders   1. Malignant neoplasm of descending colon (HCC)  CEA    CT ABDOMEN PELVIS W IV CONTRAST    CBC with Auto Differential    Comprehensive Metabolic Panel with Bilirubin   2. Adenomatous polyp of sigmoid colon     3. Diverticulosis         Will suppress his diarrhea with Imodium   We will proceed with the above testing    Diet/life style/natural hx /complication of the dx were all explained in details   Past medical, past surgical, social history, psychiatric history, medications or allergies, all reviewed and  updated        Thank you for allowing me to participate in the care of Mr. Reyes Quan. For any further questions please do not hesitate to contact me. I have reviewed and agree with the MA/RN ROS. Note is dictated utilizing voice recognition software. Unfortunately this leads to occasional typographical errors. Please contact our office if you have any questions.     Lonell Simmonds, MD  [facility]  O: #[phone]

## 2022-05-24 ENCOUNTER — HOSPITAL ENCOUNTER (OUTPATIENT)
Age: 62
Discharge: HOME | End: 2022-05-24
Payer: COMMERCIAL

## 2022-05-24 ENCOUNTER — HOSPITAL ENCOUNTER (OUTPATIENT)
Dept: CT IMAGING | Age: 62
Discharge: HOME | End: 2022-05-26
Payer: COMMERCIAL

## 2022-05-24 DIAGNOSIS — C18.6 MALIGNANT NEOPLASM OF DESCENDING COLON (HCC): ICD-10-CM

## 2022-05-24 LAB
ABSOLUTE EOS #: 0.2 K/UL (ref 0–0.4)
ABSOLUTE LYMPH #: 1.4 K/UL (ref 1–4.8)
ABSOLUTE MONO #: 0.5 K/UL (ref 0.1–1.3)
ALBUMIN SERPL-MCNC: 4.6 G/DL (ref 3.5–5.2)
ALP BLD-CCNC: 52 U/L (ref 40–129)
ALT SERPL-CCNC: 34 U/L (ref 5–41)
ANION GAP SERPL CALCULATED.3IONS-SCNC: 12 MMOL/L (ref 9–17)
AST SERPL-CCNC: 26 U/L
BASOPHILS # BLD: 0 % (ref 0–2)
BASOPHILS ABSOLUTE: 0 K/UL (ref 0–0.2)
BILIRUB SERPL-MCNC: 0.35 MG/DL (ref 0.3–1.2)
BILIRUBIN DIRECT: 0.09 MG/DL
BILIRUBIN, INDIRECT: 0.26 MG/DL (ref 0–1)
BUN BLDV-MCNC: 17 MG/DL (ref 6–20)
CALCIUM SERPL-MCNC: 9.1 MG/DL (ref 8.6–10.4)
CARCINOEMBRYONIC ANTIGEN: 1.1 NG/ML
CHLORIDE BLD-SCNC: 104 MMOL/L (ref 98–107)
CO2: 27 MMOL/L (ref 20–31)
CREAT SERPL-MCNC: 1.04 MG/DL (ref 0.7–1.2)
EOSINOPHILS RELATIVE PERCENT: 4 % (ref 0–4)
GFR AFRICAN AMERICAN: >60 ML/MIN
GFR NON-AFRICAN AMERICAN: >60 ML/MIN
GFR SERPL CREATININE-BSD FRML MDRD: ABNORMAL ML/MIN/{1.73_M2}
GLUCOSE BLD-MCNC: 161 MG/DL (ref 70–99)
HCT VFR BLD CALC: 43.5 % (ref 41–53)
HEMOGLOBIN: 14.9 G/DL (ref 13.5–17.5)
LYMPHOCYTES # BLD: 22 % (ref 24–44)
MCH RBC QN AUTO: 30.6 PG (ref 26–34)
MCHC RBC AUTO-ENTMCNC: 34.3 G/DL (ref 31–37)
MCV RBC AUTO: 89.2 FL (ref 80–100)
MONOCYTES # BLD: 9 % (ref 1–7)
PDW BLD-RTO: 13.6 % (ref 11.5–14.9)
PLATELET # BLD: 216 K/UL (ref 150–450)
PMV BLD AUTO: 9 FL (ref 6–12)
POTASSIUM SERPL-SCNC: 4.4 MMOL/L (ref 3.7–5.3)
RBC # BLD: 4.88 M/UL (ref 4.5–5.9)
SEG NEUTROPHILS: 65 % (ref 36–66)
SEGMENTED NEUTROPHILS ABSOLUTE COUNT: 4 K/UL (ref 1.3–9.1)
SODIUM BLD-SCNC: 143 MMOL/L (ref 135–144)
TOTAL PROTEIN: 7.3 G/DL (ref 6.4–8.3)
WBC # BLD: 6.2 K/UL (ref 3.5–11)

## 2022-05-24 PROCEDURE — 74177 CT ABD & PELVIS W/CONTRAST: CPT

## 2022-05-24 PROCEDURE — 80053 COMPREHEN METABOLIC PANEL: CPT

## 2022-05-24 PROCEDURE — 82248 BILIRUBIN DIRECT: CPT

## 2022-05-24 PROCEDURE — 82378 CARCINOEMBRYONIC ANTIGEN: CPT

## 2022-05-24 PROCEDURE — 36415 COLL VENOUS BLD VENIPUNCTURE: CPT

## 2022-05-24 PROCEDURE — 85025 COMPLETE CBC W/AUTO DIFF WBC: CPT

## 2022-05-24 PROCEDURE — 6360000004 HC RX CONTRAST MEDICATION: Performed by: INTERNAL MEDICINE

## 2022-05-24 PROCEDURE — 2580000003 HC RX 258: Performed by: INTERNAL MEDICINE

## 2022-05-24 RX ORDER — 0.9 % SODIUM CHLORIDE 0.9 %
80 INTRAVENOUS SOLUTION INTRAVENOUS ONCE
Status: COMPLETED | OUTPATIENT
Start: 2022-05-24 | End: 2022-05-24

## 2022-05-24 RX ORDER — SODIUM CHLORIDE 0.9 % (FLUSH) 0.9 %
10 SYRINGE (ML) INJECTION AS NEEDED
Status: DISCONTINUED | OUTPATIENT
Start: 2022-05-24 | End: 2022-05-27 | Stop reason: HOSPADM

## 2022-05-24 RX ADMIN — SODIUM CHLORIDE, PRESERVATIVE FREE 10 ML: 5 INJECTION INTRAVENOUS at 08:48

## 2022-05-24 RX ADMIN — IOHEXOL 50 ML: 240 INJECTION, SOLUTION INTRATHECAL; INTRAVASCULAR; INTRAVENOUS; ORAL at 08:50

## 2022-05-24 RX ADMIN — SODIUM CHLORIDE 80 ML: 9 INJECTION, SOLUTION INTRAVENOUS at 08:45

## 2022-05-24 RX ADMIN — IOPAMIDOL 75 ML: 755 INJECTION, SOLUTION INTRAVENOUS at 08:49

## 2022-09-16 ENCOUNTER — OFFICE VISIT (OUTPATIENT)
Dept: GASTROENTEROLOGY | Age: 62
End: 2022-09-16
Payer: COMMERCIAL

## 2022-09-16 VITALS
BODY MASS INDEX: 34.65 KG/M2 | TEMPERATURE: 97 F | HEIGHT: 70 IN | SYSTOLIC BLOOD PRESSURE: 105 MMHG | WEIGHT: 242 LBS | DIASTOLIC BLOOD PRESSURE: 66 MMHG | HEART RATE: 84 BPM

## 2022-09-16 DIAGNOSIS — K57.90 DIVERTICULOSIS: ICD-10-CM

## 2022-09-16 DIAGNOSIS — C18.6 MALIGNANT NEOPLASM OF DESCENDING COLON (HCC): Primary | ICD-10-CM

## 2022-09-16 DIAGNOSIS — D12.5 ADENOMATOUS POLYP OF SIGMOID COLON: ICD-10-CM

## 2022-09-16 PROCEDURE — 99214 OFFICE O/P EST MOD 30 MIN: CPT | Performed by: INTERNAL MEDICINE

## 2022-09-16 RX ORDER — POLYETHYLENE GLYCOL 3350, SODIUM SULFATE ANHYDROUS, SODIUM BICARBONATE, SODIUM CHLORIDE, POTASSIUM CHLORIDE 236; 22.74; 6.74; 5.86; 2.97 G/4L; G/4L; G/4L; G/4L; G/4L
4 POWDER, FOR SOLUTION ORAL ONCE
Qty: 4000 ML | Refills: 0 | Status: SHIPPED | OUTPATIENT
Start: 2022-09-16 | End: 2022-09-16

## 2022-09-16 ASSESSMENT — ENCOUNTER SYMPTOMS
ANAL BLEEDING: 0
DIARRHEA: 1
VOMITING: 0
ABDOMINAL PAIN: 0
SHORTNESS OF BREATH: 0
BLOOD IN STOOL: 0
CONSTIPATION: 1
COUGH: 0
RECTAL PAIN: 0
TROUBLE SWALLOWING: 0
WHEEZING: 0
NAUSEA: 0
ABDOMINAL DISTENTION: 0
CHOKING: 0

## 2022-09-16 NOTE — PROGRESS NOTES
GI CLINIC FOLLOW UP    INTERVAL HISTORY:   No referring provider defined for this encounter. Chief Complaint   Patient presents with    Diarrhea    Colon Cancer     Patient is f/u on CT and labs,  He has hx of colon cancer. He c/o back and forth constipation and diarrhea           HISTORY OF PRESENT ILLNESS: Mahogany Weller is a 63 y.o. male , referred for evaluation of  hx colon ca , diverticulosis , change in bowel ,polyps, diverticulosis    here for f/u   We have diagnosed him with colon cancer in 2016 his tumor was T1 a N0 M0 did not need any adjuvant therapy treated surgically,    Last visit we ordered ct and CEA   Ct : negative   CEA normal   Did not have a colonoscopy since 2018 he did have some polyps and have some diverticulosis in the past.  He denied any symptoms at this time except the chronic diarrhea which she has since his surgery takes Imodium but the Imodium on a daily basis sometimes make him constipated then he takes prune juice but with this he is able to control his bowel and is satisfied where he is at this time he denied any heartburn denied any odynophagia or dysphagia  His labs which I ordered him on May showed normal CEA normal liver enzymes normal CBC and his CAT scan was negative as mentioned        Past Medical,Family, and Social History reviewed and does contribute to the patient presentingcondition. Patient's PMH/PSH,SH,PSYCH Hx, MEDs, ALLERGIES, and ROS were all reviewed and updated in the appropriate sections. PAST MEDICAL HISTORY:  Past Medical History:   Diagnosis Date    Arthritis     CAD (coronary artery disease)     Diabetes mellitus ([facility] [address]     H/O colon cancer, stage I     Hyperlipidemia     Hyperplastic polyp of intestine     Hypertension     Pneumonia     Polyp of colon, villous adenoma     WITH HIGH GRADE DYSPLASIA     Tubular adenoma        Past Surgical History:   Procedure Laterality Date    COLECTOMY  4/21/15    LOWER ANTERIOR RESECTION, INTRAOP. COLONOSCOPY, DIVERTING LOOP COLOSTOMY    COLONOSCOPY  3-24-15    VILLOUS ADENOMA WITH FOCAL HIGH GRADE DYSPLASIA, TUBULAR ADENOMA AND HYPERPLASTIC POLYP     COLONOSCOPY  6/23/15    BENIGN SMALL INTESTINE AND CONTIGUOUS SKIN, CONSISTENT WITH ILEOTOMY     COLONOSCOPY  03/17/16    DIVERTICULOSIS, HEMORRHOIDS     CORONARY ANGIOPLASTY WITH STENT PLACEMENT  2004 2009 2013     TOTAL OF 10 STENTS    OTHER SURGICAL HISTORY Left 2 YRS AGO    SHOULDER SURGERY    OTHER SURGICAL HISTORY  6/30/15    ileostomy reversal    DC COLON CA SCRN NOT HI RSK IND N/A 10/18/2018    COLONOSCOPY performed by Aleida Balderrama MD at [facility]  4/16/15    tatooing    TESTICLE SURGERY Right     TIBIA FRACTURE SURGERY Right 1975    & FIB    TONSILLECTOMY      VASECTOMY  25YRS AGO       CURRENT MEDICATIONS:    Current Outpatient Medications:     metFORMIN (GLUCOPHAGE) 500 MG tablet, TAKE 1 TABLET BY MOUTH TWO TIMES A DAY, Disp: , Rfl: 1    loratadine (CLARITIN) 10 MG capsule, Take 10 mg by mouth daily, Disp: , Rfl:     ZETIA 10 MG tablet, , Disp: , Rfl:     NITROSTAT 0.4 MG SL tablet, , Disp: , Rfl:     rosuvastatin (CRESTOR) 40 MG tablet, Take 40 mg by mouth every evening., Disp: , Rfl:     metoprolol (TOPROL-XL) 50 MG XL tablet, Take 50 mg by mouth daily. , Disp: , Rfl:     lisinopril (PRINIVIL;ZESTRIL) 5 MG tablet, Take 5 mg by mouth daily. , Disp: , Rfl:     aspirin 81 MG tablet, Take 81 mg by mouth daily. , Disp: , Rfl:     ALLERGIES:   Allergies   Allergen Reactions    Iodine Rash     SEVERE ITCHING AND RASH- pt states he thinks he is ok with it 9/16/22       FAMILY HISTORY:       Problem Relation Age of Onset    Heart Attack Father     Hypertension Father     High Cholesterol Father     Hypertension Brother     Heart Attack Brother     Cancer Brother         skin cancer    Heart Attack Brother     Heart Attack Paternal Grandfather     Heart Attack Maternal Grandfather          SOCIAL HISTORY:   Social History     Socioeconomic History Marital status:      Spouse name: Not on file    Number of children: Not on file    Years of education: Not on file    Highest education level: Not on file   Occupational History    Not on file   Tobacco Use    Smoking status: Former     Types: Cigarettes     Quit date: 2005     Years since quittin.4     Passive exposure: Past    Smokeless tobacco: Never   Vaping Use    Vaping Use: Never used   Substance and Sexual Activity    Alcohol use: Yes     Comment: RARE    Drug use: No    Sexual activity: Not on file   Other Topics Concern    Not on file   Social History Narrative    Not on file     Social Determinants of Health     Financial Resource Strain: Not on file   Food Insecurity: Not on file   Transportation Needs: Not on file   Physical Activity: Not on file   Stress: Not on file   Social Connections: Not on file   Intimate Partner Violence: Not on file   Housing Stability: Not on file       REVIEW OF SYSTEMS: A 12-point review of systemswas obtained and pertinent positives and negatives were enumerated above in the history of present illness. All other reviewed systems / symptoms were negative. Review of Systems   Constitutional:  Negative for appetite change, fatigue and unexpected weight change. HENT:  Negative for trouble swallowing. Respiratory:  Negative for cough, choking, shortness of breath and wheezing. Cardiovascular:  Negative for chest pain, palpitations and leg swelling. Gastrointestinal:  Positive for constipation and diarrhea. Negative for abdominal distention, abdominal pain, anal bleeding, blood in stool, nausea, rectal pain and vomiting. Genitourinary:  Negative for difficulty urinating. Allergic/Immunologic: Negative for environmental allergies and food allergies. Neurological:  Negative for dizziness, weakness, light-headedness, numbness and headaches. Hematological:  Does not bruise/bleed easily.    Psychiatric/Behavioral:  Negative for sleep disturbance. The patient is not nervous/anxious. LABORATORY DATA: Reviewed  Lab Results   Component Value Date    WBC 6.2 05/24/2022    HGB 14.9 05/24/2022    HCT 43.5 05/24/2022    MCV 89.2 05/24/2022     05/24/2022     05/24/2022    K 4.4 05/24/2022     05/24/2022    CO2 27 05/24/2022    BUN 17 05/24/2022    CREATININE 1.04 05/24/2022    LABALBU 4.6 05/24/2022    BILITOT 0.35 05/24/2022    ALKPHOS 52 05/24/2022    AST 26 05/24/2022    ALT 34 05/24/2022         Lab Results   Component Value Date    RBC 4.88 05/24/2022    HGB 14.9 05/24/2022    MCV 89.2 05/24/2022    MCH 30.6 05/24/2022    MCHC 34.3 05/24/2022    RDW 13.6 05/24/2022    MPV 9.0 05/24/2022    BASOPCT 0 05/24/2022    LYMPHSABS 1.40 05/24/2022    MONOSABS 0.50 05/24/2022    NEUTROABS 4.00 05/24/2022    EOSABS 0.20 05/24/2022    BASOSABS 0.00 05/24/2022         DIAGNOSTIC TESTING:     No results found. PHYSICAL EXAMINATION: Vital signs reviewed per the nursing documentation. /66   Pulse 84   Temp 97 °F (36.1 °C)   Ht 5' 10\" (1.778 m)   Wt 242 lb (109.8 kg)   BMI 34.72 kg/m²   Body mass index is 34.72 kg/m². Physical Exam  Vitals and nursing note reviewed. Constitutional:       General: He is not in acute distress. Appearance: He is well-developed. He is not diaphoretic. HENT:      Head: Normocephalic and atraumatic. Eyes:      General: No scleral icterus. Pupils: Pupils are equal, round, and reactive to light. Neck:      Thyroid: No thyromegaly. Vascular: No JVD. Trachea: No tracheal deviation. Cardiovascular:      Rate and Rhythm: Normal rate and regular rhythm. Heart sounds: Normal heart sounds. No murmur heard. Pulmonary:      Effort: Pulmonary effort is normal. No respiratory distress. Breath sounds: Normal breath sounds. No wheezing. Abdominal:      General: Bowel sounds are normal. There is no distension. Palpations: Abdomen is soft. There is no mass. Tenderness: There is no abdominal tenderness. There is no guarding or rebound. Musculoskeletal:         General: No tenderness. Normal range of motion. Cervical back: Normal range of motion and neck supple. Skin:     General: Skin is warm. Coloration: Skin is not pale. Findings: No erythema or rash. Comments: He is not diaphoretic   Neurological:      Mental Status: He is alert and oriented to person, place, and time. Deep Tendon Reflexes: Reflexes are normal and symmetric. Psychiatric:         Behavior: Behavior normal.         Thought Content: Thought content normal.         Judgment: Judgment normal.         IMPRESSION: Mr. Bill De Leon is a 63 y.o. male with      Diagnosis Orders   1. Malignant neoplasm of descending colon (HCC)  COLONOSCOPY W/ OR W/O BIOPSY      2. Adenomatous polyp of sigmoid colon        3. Diverticulosis          Will proceed with the above  As mentioned his CEA and CAT scans are negative and is managing his diarrhea with Imodium and his labs are all normal    Diet/life style/natural hx /complication of the dx were all explained in details   Past medical, past surgical, social history, psychiatric history, medications or allergies, all reviewed and  updated    Thank you for allowing me to participate in the care of Mr. Bill De Leon. For any further questions please do not hesitate to contact me. I have reviewed and agree with the ROS entered by the MA/RN. Note is dictated utilizing voice recognition software. Unfortunately this leads to occasional typographical errors. Please contact our office if you have any questions.       Farzaneh Rodriguez MD  [facility]  O: #[phone]

## 2022-09-27 NOTE — TELEPHONE ENCOUNTER
Rep from [address] called asking for CPT code for procedure. No CPT code on file.   ICD code for procedure is provided per her request.

## 2022-10-03 ENCOUNTER — TELEPHONE (OUTPATIENT)
Dept: GASTROENTEROLOGY | Age: 62
End: 2022-10-03

## 2022-10-03 NOTE — TELEPHONE ENCOUNTER
Rec'd aspirin clearance, pt is to hold aspirin x 7 days, start holding 10/7/22 and resume after procedure.  Writer called pt and left VM leaving instructions

## 2022-10-13 ENCOUNTER — ANESTHESIA EVENT (OUTPATIENT)
Dept: OPERATING ROOM | Age: 62
End: 2022-10-13
Payer: COMMERCIAL

## 2022-10-14 ENCOUNTER — ANESTHESIA (OUTPATIENT)
Dept: OPERATING ROOM | Age: 62
End: 2022-10-14
Payer: COMMERCIAL

## 2022-10-14 ENCOUNTER — HOSPITAL ENCOUNTER (OUTPATIENT)
Age: 62
Setting detail: OUTPATIENT SURGERY
Discharge: HOME | End: 2022-10-14
Attending: INTERNAL MEDICINE | Admitting: INTERNAL MEDICINE
Payer: COMMERCIAL

## 2022-10-14 VITALS
SYSTOLIC BLOOD PRESSURE: 130 MMHG | TEMPERATURE: 97 F | OXYGEN SATURATION: 95 % | RESPIRATION RATE: 20 BRPM | HEIGHT: 70 IN | BODY MASS INDEX: 35.07 KG/M2 | DIASTOLIC BLOOD PRESSURE: 89 MMHG | WEIGHT: 245 LBS | HEART RATE: 89 BPM

## 2022-10-14 LAB
GLUCOSE BLD-MCNC: 109 MG/DL (ref 75–110)
GLUCOSE BLD-MCNC: 121 MG/DL (ref 75–110)

## 2022-10-14 PROCEDURE — 2580000003 HC RX 258: Performed by: ANESTHESIOLOGY

## 2022-10-14 PROCEDURE — 7100000010 HC PHASE II RECOVERY - FIRST 15 MIN: Performed by: INTERNAL MEDICINE

## 2022-10-14 PROCEDURE — 7100000011 HC PHASE II RECOVERY - ADDTL 15 MIN: Performed by: INTERNAL MEDICINE

## 2022-10-14 PROCEDURE — 2500000003 HC RX 250 WO HCPCS: Performed by: SPECIALIST

## 2022-10-14 PROCEDURE — 2709999900 HC NON-CHARGEABLE SUPPLY: Performed by: INTERNAL MEDICINE

## 2022-10-14 PROCEDURE — 6360000002 HC RX W HCPCS: Performed by: SPECIALIST

## 2022-10-14 PROCEDURE — 3700000000 HC ANESTHESIA ATTENDED CARE: Performed by: INTERNAL MEDICINE

## 2022-10-14 PROCEDURE — 3609027000 HC COLONOSCOPY: Performed by: INTERNAL MEDICINE

## 2022-10-14 PROCEDURE — 82947 ASSAY GLUCOSE BLOOD QUANT: CPT

## 2022-10-14 PROCEDURE — 3700000001 HC ADD 15 MINUTES (ANESTHESIA): Performed by: INTERNAL MEDICINE

## 2022-10-14 PROCEDURE — 45378 DIAGNOSTIC COLONOSCOPY: CPT | Performed by: INTERNAL MEDICINE

## 2022-10-14 RX ORDER — SODIUM CHLORIDE 9 MG/ML
INJECTION, SOLUTION INTRAVENOUS CONTINUOUS
Status: DISCONTINUED | OUTPATIENT
Start: 2022-10-15 | End: 2022-10-14 | Stop reason: HOSPADM

## 2022-10-14 RX ORDER — SODIUM CHLORIDE 0.9 % (FLUSH) 0.9 %
5-40 SYRINGE (ML) INJECTION EVERY 12 HOURS SCHEDULED
Status: DISCONTINUED | OUTPATIENT
Start: 2022-10-14 | End: 2022-10-14 | Stop reason: HOSPADM

## 2022-10-14 RX ORDER — SODIUM CHLORIDE, SODIUM LACTATE, POTASSIUM CHLORIDE, CALCIUM CHLORIDE 600; 310; 30; 20 MG/100ML; MG/100ML; MG/100ML; MG/100ML
INJECTION, SOLUTION INTRAVENOUS CONTINUOUS
Status: DISCONTINUED | OUTPATIENT
Start: 2022-10-15 | End: 2022-10-14 | Stop reason: HOSPADM

## 2022-10-14 RX ORDER — SODIUM CHLORIDE 9 MG/ML
INJECTION, SOLUTION INTRAVENOUS PRN
Status: DISCONTINUED | OUTPATIENT
Start: 2022-10-14 | End: 2022-10-14 | Stop reason: HOSPADM

## 2022-10-14 RX ORDER — LIDOCAINE HYDROCHLORIDE 10 MG/ML
1 INJECTION, SOLUTION EPIDURAL; INFILTRATION; INTRACAUDAL; PERINEURAL
Status: DISCONTINUED | OUTPATIENT
Start: 2022-10-15 | End: 2022-10-14 | Stop reason: HOSPADM

## 2022-10-14 RX ORDER — PROPOFOL 10 MG/ML
INJECTION, EMULSION INTRAVENOUS PRN
Status: DISCONTINUED | OUTPATIENT
Start: 2022-10-14 | End: 2022-10-14 | Stop reason: SDUPTHER

## 2022-10-14 RX ORDER — LIDOCAINE HYDROCHLORIDE 20 MG/ML
INJECTION, SOLUTION EPIDURAL; INFILTRATION; INTRACAUDAL; PERINEURAL PRN
Status: DISCONTINUED | OUTPATIENT
Start: 2022-10-14 | End: 2022-10-14 | Stop reason: SDUPTHER

## 2022-10-14 RX ORDER — SODIUM CHLORIDE 0.9 % (FLUSH) 0.9 %
5-40 SYRINGE (ML) INJECTION PRN
Status: DISCONTINUED | OUTPATIENT
Start: 2022-10-14 | End: 2022-10-14 | Stop reason: HOSPADM

## 2022-10-14 RX ADMIN — SODIUM CHLORIDE, POTASSIUM CHLORIDE, SODIUM LACTATE AND CALCIUM CHLORIDE: 600; 310; 30; 20 INJECTION, SOLUTION INTRAVENOUS at 09:42

## 2022-10-14 RX ADMIN — PROPOFOL 50 MG: 10 INJECTION, EMULSION INTRAVENOUS at 10:30

## 2022-10-14 RX ADMIN — PROPOFOL 50 MG: 10 INJECTION, EMULSION INTRAVENOUS at 10:36

## 2022-10-14 RX ADMIN — PROPOFOL 50 MG: 10 INJECTION, EMULSION INTRAVENOUS at 10:33

## 2022-10-14 RX ADMIN — PROPOFOL 100 MG: 10 INJECTION, EMULSION INTRAVENOUS at 10:43

## 2022-10-14 RX ADMIN — PROPOFOL 100 MG: 10 INJECTION, EMULSION INTRAVENOUS at 10:27

## 2022-10-14 RX ADMIN — LIDOCAINE HYDROCHLORIDE 100 MG: 20 INJECTION, SOLUTION EPIDURAL; INFILTRATION; INTRACAUDAL; PERINEURAL at 10:27

## 2022-10-14 RX ADMIN — PROPOFOL 50 MG: 10 INJECTION, EMULSION INTRAVENOUS at 10:39

## 2022-10-14 ASSESSMENT — PAIN - FUNCTIONAL ASSESSMENT: PAIN_FUNCTIONAL_ASSESSMENT: 0-10

## 2022-10-14 NOTE — ANESTHESIA PRE PROCEDURE
Department of Anesthesiology  Preprocedure Note       Name:  Kwabena Flynn   Age:  63 y.o.  :  1960                                          MRN:  [id number]         Date:  10/14/2022      Surgeon: Riki Farah):  Hema Hawkins MD    Procedure: Procedure(s):  COLONOSCOPY DIAGNOSTIC    Medications prior to admission:   Prior to Admission medications    Medication Sig Start Date End Date Taking? Authorizing Provider   dapagliflozin (FARXIGA) 5 MG tablet 1 tablet 12/15/21  Yes Historical Provider, MD   metFORMIN (GLUCOPHAGE) 500 MG tablet TAKE 1 TABLET BY MOUTH TWO TIMES A DAY 18   Historical Provider, MD   loratadine (CLARITIN) 10 MG capsule Take 10 mg by mouth daily    Historical Provider, MD   ZETIA 10 MG tablet  16   Historical Provider, MD   NITROSTAT 0.4 MG SL tablet  12/21/15   Historical Provider, MD   rosuvastatin (CRESTOR) 40 MG tablet Take 40 mg by mouth every evening. Historical Provider, MD   metoprolol (TOPROL-XL) 50 MG XL tablet Take 50 mg by mouth daily. Historical Provider, MD   lisinopril (PRINIVIL;ZESTRIL) 5 MG tablet Take 5 mg by mouth daily. Historical Provider, MD   aspirin 81 MG tablet Take 81 mg by mouth daily.     Historical Provider, MD       Current medications:    Current Facility-Administered Medications   Medication Dose Route Frequency Provider Last Rate Last Admin    [START ON 10/15/2022] lidocaine PF 1 % injection 1 mL  1 mL IntraDERmal Once PRN Costa Danielson DO        [START ON 10/15/2022] 0.9 % sodium chloride infusion   IntraVENous Continuous Costa Danielson DO        [START ON 10/15/2022] lactated ringers infusion   IntraVENous Continuous Kristmonserrat Zhu,  mL/hr at 10/14/22 0942 New Bag at 10/14/22 0942    sodium chloride flush 0.9 % injection 5-40 mL  5-40 mL IntraVENous 2 times per day Kristeen Leys, DO        sodium chloride flush 0.9 % injection 5-40 mL  5-40 mL IntraVENous PRN Kristmonserrat Leys, DO        0.9 % sodium chloride infusion   IntraVENous PRN Gene [place]house, DO           Allergies: Allergies   Allergen Reactions    Iodine Rash     SEVERE ITCHING AND RASH- pt states he thinks he is ok with it 9/16/22       Problem List:    Patient Active Problem List   Diagnosis Code    Polyp, sigmoid colon K63.5    CAD S/P percutaneous coronary angioplasty I25.10, Z98.61    Hyperlipemia E78.5    Hypertension I10    History of colonic polyps Z86.010    Colon cancer ([facility] [address] C18.9    Malignant neoplasm of sigmoid colon ([facility] [address] C18.7    History of colonic polyps Z86.010    Malignant neoplasm of colon ([facility] [address] C18.9    Diarrhea R19.7    Villous adenoma D48.9    History of colon cancer Z85.038    Diabetes mellitus type 2 in obese ([facility] [address] E11.69, E66.9    Arthritis M19.90    Obesity (BMI 30-39. 9) E66.9       Past Medical History:        Diagnosis Date    Arthritis     CAD (coronary artery disease)     Diabetes mellitus ([facility] [address]     H/O colon cancer, stage I     Hyperlipidemia     Hyperplastic polyp of intestine     Hypertension     Pneumonia     Polyp of colon, villous adenoma     WITH HIGH GRADE DYSPLASIA     Tubular adenoma        Past Surgical History:        Procedure Laterality Date    COLECTOMY  4/21/15    LOWER ANTERIOR RESECTION, INTRAOP.  COLONOSCOPY, DIVERTING LOOP COLOSTOMY    COLONOSCOPY  3-24-15    VILLOUS ADENOMA WITH FOCAL HIGH GRADE DYSPLASIA, TUBULAR ADENOMA AND HYPERPLASTIC POLYP     COLONOSCOPY  6/23/15    BENIGN SMALL INTESTINE AND CONTIGUOUS SKIN, CONSISTENT WITH ILEOTOMY     COLONOSCOPY  03/17/16    DIVERTICULOSIS, HEMORRHOIDS     CORONARY ANGIOPLASTY WITH STENT PLACEMENT  2004 2009 2013     TOTAL OF 10 STENTS    OTHER SURGICAL HISTORY Left 2 YRS AGO    SHOULDER SURGERY    OTHER SURGICAL HISTORY  6/30/15    ileostomy reversal    [place] COLON CA SCRN NOT HI RSK IND N/A 10/18/2018    COLONOSCOPY performed by Cynthia Valencia MD at [facility]  4/16/15    tatooing    TESTICLE SURGERY Right     PROT 7.3 05/24/2022 07:19 AM    CALCIUM 9.1 05/24/2022 07:19 AM    BILITOT 0.35 05/24/2022 07:19 AM    ALKPHOS 52 05/24/2022 07:19 AM    AST 26 05/24/2022 07:19 AM    ALT 34 05/24/2022 07:19 AM       POC Tests:   Recent Labs     10/14/22  0937   POCGLU 121*       Coags: No results found for: PROTIME, INR, APTT    HCG (If Applicable): No results found for: PREGTESTUR, PREGSERUM, HCG, HCGQUANT     ABGs: No results found for: PHART, PO2ART, NJQ1YLF, GZH2DRO, BEART, B3IYTERI     Type & Screen (If Applicable):  No results found for: LABABO, LABRH    Drug/Infectious Status (If Applicable):  No results found for: HIV, HEPCAB    COVID-19 Screening (If Applicable): No results found for: COVID19        Anesthesia Evaluation    Airway: Mallampati: I  TM distance: >3 FB   Neck ROM: full  Mouth opening: > = 3 FB   Dental:          Pulmonary:                              Cardiovascular:    (+) hypertension:, CABG/stent:,     (-)  angina                Neuro/Psych:               GI/Hepatic/Renal:             Endo/Other:    (+) Diabetes, . Abdominal:             Vascular:           Other Findings:           Anesthesia Plan      MAC     ASA 3                                   Crista Hess MD   10/14/2022

## 2022-10-14 NOTE — DISCHARGE INSTRUCTIONS
Colonoscopy: What to Expect at Home  Your Recovery  Your doctor will talk to you about when you will need your next colonoscopy. The results of your test and your risk for colorectal cancer will help your doctor decide how often you need to be checked. After the test, you may be bloated or have gas pains. You may need to pass gas. If a biopsy was done or a polyp was removed, you may have streaks of blood in your stool (feces) for a few days. How can you care for yourself at home? Activity  Rest as much as you need to after you go home. You should be able to go back to your usual activities the day after the test.  Diet  Follow your doctors directions for eating. Drink plenty of fluids (unless your doctor has told you not to) to replace the fluids that were lost during the colon prep. Medicines  If polyps were removed or a biopsy was done during the test, your doctor may tell you not to take aspirin or other anti-inflammatory medicines, such as ibuprofen (Advil, Motrin) and naproxen (Aleve), for a few days. Follow-up care is a key part of your treatment and safety. Be sure to make and go to all appointments, and call your doctor if you are having problems. When should you call for help? Call 911 anytime you think you may need emergency care. For example, call if:  You passed out (lost consciousness). You pass maroon or bloody stools. You have severe belly pain. Call your doctor now or seek immediate medical care if:  Your stools are black and tarlike. Your stools have streaks of blood, but you did not have a biopsy or any polyps removed. You have belly pain, or your belly is swollen and firm. You vomit. You have a fever. You are very dizzy. Watch closely for changes in your health, and be sure to contact your doctor if you have any problems. Where can you learn more? Go to [email or web address] and sign in to your [employer] account.  Enter E264 in the [address] Information box to learn more about Colonoscopy: What to Expect at Home.        © 7804-8174 Healthwise, Incorporated. Care instructions adapted under license by [facility]. This care instruction is for use with your licensed healthcare professional. If you have questions about a medical condition or this instruction, always ask your healthcare professional. [address] any warranty or liability for your use of this information.   Content Version: 1.9.503456; Last Revised: February 20, 2013

## 2022-10-14 NOTE — OP NOTE
normal    Descending/Sigmoid colon: Clean scar from the previous surgical anastomosis located at 12 cm from the anal verge     Rectum/Anus: examined in normal and retroflexed positions and was abnormal: Hemorrhoids    Withdrawal Time was (minutes): 12    The colon was decompressed and the scope was removed. The patient tolerated the procedure well.      Recommendations/Plan:   Lifestyle and dietary modifications as discussed  F/U Biopsies  F/U In OfficeYes  Discussed with the family  Repeat colonoscopy [id number]    Electronically signed by Jorden De Oliveira MD  on 10/14/2022 at 10:56 AM

## 2022-10-14 NOTE — H&P
Interval H&P Note    Pt Name: Crispin Miller  MRN: [id number]  YOB: 1960  Date of evaluation: 10/14/2022      [x] I have reviewed in [facility] the gastroenterology progress note by Dr. Marti Ambriz dated 9/16/2022 for an Interval History and Physical note. [x] I have examined  Crispin Miller  There are no changes to the patient who is scheduled for COLONOSCOPY DIAGNOSTIC by Jorden De Oliveira MD for Malignant neoplasm of descending colon ([facility] [address] [C18.6]. The patient denies new health changes, fever, chills, wheezing, cough, open sores or wounds. History of coronary artery disease with 11 stents (patient states last stent about 10 years ago), hyperlipidemia, hypertension, diabetes, sleep apnea. Patient follows with [facility]. Patient received clearance (scanned into [facility]) from cardiology to stop Aspirin prior to procedure. Patient denies shortness of breath, chest pain, syncope. Patient has occasional palpitations with stress. He states he has dizziness with quick position changes. POC . Last ASA 81mg 10/6/2022. Vital signs: /81   Pulse 90   Temp 97.5 °F (36.4 °C) (Temporal)   Resp 20   Ht 5' 10\" (1.778 m)   Wt 245 lb (111.1 kg)   SpO2 97%   BMI 35.15 kg/m²     Allergies:  Iodine    Medications:    Prior to Admission medications    Medication Sig Start Date End Date Taking? Authorizing Provider   dapagliflozin (FARXIGA) 5 MG tablet 1 tablet 12/15/21  Yes Historical Provider, MD   metFORMIN (GLUCOPHAGE) 500 MG tablet TAKE 1 TABLET BY MOUTH TWO TIMES A DAY 4/9/18   Historical Provider, MD   loratadine (CLARITIN) 10 MG capsule Take 10 mg by mouth daily    Historical Provider, MD   ZETIA 10 MG tablet  4/27/16   Historical Provider, MD   NITROSTAT 0.4 MG SL tablet  12/21/15   Historical Provider, MD   rosuvastatin (CRESTOR) 40 MG tablet Take 40 mg by mouth every evening. Historical Provider, MD   metoprolol (TOPROL-XL) 50 MG XL tablet Take 50 mg by mouth daily.     Historical Provider, MD   lisinopril (PRINIVIL;ZESTRIL) 5 MG tablet Take 5 mg by mouth daily. Historical Provider, MD   aspirin 81 MG tablet Take 81 mg by mouth daily. Historical Provider, MD         This is a 63 y.o. male who is pleasant, cooperative, alert and oriented x3, in no acute distress. Heart: Heart sounds are normal.  HR 90 regular rate and rhythm without murmur, gallop or rub. Lungs: Normal respiratory effort with equal expansion, good air exchange, unlabored and clear to auscultation without wheezes or rales bilaterally   Abdomen: soft, nontender, nondistended with bowel sounds active. Labs:  No results for input(s): HGB, HCT, WBC, MCV, PLT, NA, K, CL, CO2, BUN, CREATININE, GLUCOSE, INR, PROTIME, APTT, AST, ALT, LABALBU, HCG in the last 720 hours. No results for input(s): COVID19 in the last 720 hours. JAKUB Smith CNP  Electronically signed 10/14/2022 at 9:41 Kaci Lawler MD   Physician   Specialty:  Gastroenterology   Progress Notes      Signed   Encounter Date:  9/16/2022          Related encounter: Office Visit from 9/16/2022 in [facility]           Signed        Expand All Collapse All        GI CLINIC FOLLOW UP     INTERVAL HISTORY:   No referring provider defined for this encounter. Chief Complaint   Patient presents with    Diarrhea    Colon Cancer       Patient is f/u on CT and labs,  He has hx of colon cancer.  He c/o back and forth constipation and diarrhea               HISTORY OF PRESENT ILLNESS: Katherin Davis is a 63 y.o. male , referred for evaluation of  hx colon ca , diverticulosis , change in bowel ,polyps, diverticulosis    here for f/u   We have diagnosed him with colon cancer in 2016 his tumor was T1 a N0 M0 did not need any adjuvant therapy treated surgically,     Last visit we ordered ct and CEA   Ct : negative   CEA normal   Did not have a colonoscopy since 2018 he did have some polyps and have some diverticulosis in the past.  He denied any symptoms at this time except the chronic diarrhea which she has since his surgery takes Imodium but the Imodium on a daily basis sometimes make him constipated then he takes prune juice but with this he is able to control his bowel and is satisfied where he is at this time he denied any heartburn denied any odynophagia or dysphagia  His labs which I ordered him on May showed normal CEA normal liver enzymes normal CBC and his CAT scan was negative as mentioned           Past Medical,Family, and Social History reviewed and does contribute to the patient presentingcondition. Patient's PMH/PSH,SH,PSYCH Hx, MEDs, ALLERGIES, and ROS were all reviewed and updated in the appropriate sections. PAST MEDICAL HISTORY:  Past Medical History        Past Medical History:   Diagnosis Date    Arthritis      CAD (coronary artery disease)      Diabetes mellitus ([facility] [address]      H/O colon cancer, stage I      Hyperlipidemia      Hyperplastic polyp of intestine      Hypertension      Pneumonia      Polyp of colon, villous adenoma       WITH HIGH GRADE DYSPLASIA     Tubular adenoma              Past Surgical History         Past Surgical History:   Procedure Laterality Date    COLECTOMY   4/21/15     LOWER ANTERIOR RESECTION, INTRAOP.  COLONOSCOPY, DIVERTING LOOP COLOSTOMY    COLONOSCOPY   3-24-15     VILLOUS ADENOMA WITH FOCAL HIGH GRADE DYSPLASIA, TUBULAR ADENOMA AND HYPERPLASTIC POLYP     COLONOSCOPY   6/23/15     BENIGN SMALL INTESTINE AND CONTIGUOUS SKIN, CONSISTENT WITH ILEOTOMY     COLONOSCOPY   03/17/16     DIVERTICULOSIS, HEMORRHOIDS     CORONARY ANGIOPLASTY WITH STENT PLACEMENT   2004 2009 2013      TOTAL OF 10 STENTS    OTHER SURGICAL HISTORY Left 2 YRS AGO     SHOULDER SURGERY    OTHER SURGICAL HISTORY   6/30/15     ileostomy reversal    [place] COLON CA SCRN NOT HI RSK IND N/A 10/18/2018     COLONOSCOPY performed by Sean Hoang MD at [address]   4/16/15     tatooing TESTICLE SURGERY Right      TIBIA FRACTURE SURGERY Right      & FIB    TONSILLECTOMY        VASECTOMY   25YRS AGO            CURRENT MEDICATIONS:    Current Medication      Current Outpatient Medications:     metFORMIN (GLUCOPHAGE) 500 MG tablet, TAKE 1 TABLET BY MOUTH TWO TIMES A DAY, Disp: , Rfl: 1    loratadine (CLARITIN) 10 MG capsule, Take 10 mg by mouth daily, Disp: , Rfl:     ZETIA 10 MG tablet, , Disp: , Rfl:     NITROSTAT 0.4 MG SL tablet, , Disp: , Rfl:     rosuvastatin (CRESTOR) 40 MG tablet, Take 40 mg by mouth every evening., Disp: , Rfl:     metoprolol (TOPROL-XL) 50 MG XL tablet, Take 50 mg by mouth daily. , Disp: , Rfl:     lisinopril (PRINIVIL;ZESTRIL) 5 MG tablet, Take 5 mg by mouth daily. , Disp: , Rfl:     aspirin 81 MG tablet, Take 81 mg by mouth daily. , Disp: , Rfl:         ALLERGIES:         Allergies   Allergen Reactions    Iodine Rash       SEVERE ITCHING AND RASH- pt states he thinks he is ok with it 22         FAMILY HISTORY:   Family History             Problem Relation Age of Onset    Heart Attack Father      Hypertension Father      High Cholesterol Father      Hypertension Brother      Heart Attack Brother      Cancer Brother           skin cancer    Heart Attack Brother      Heart Attack Paternal Grandfather      Heart Attack Maternal Grandfather                 SOCIAL HISTORY:   Social History               Socioeconomic History    Marital status:        Spouse name: Not on file    Number of children: Not on file    Years of education: Not on file    Highest education level: Not on file   Occupational History    Not on file   Tobacco Use    Smoking status: Former       Types: Cigarettes       Quit date: 2005       Years since quittin.4       Passive exposure: Past    Smokeless tobacco: Never   Vaping Use    Vaping Use: Never used   Substance and Sexual Activity    Alcohol use: Yes       Comment: RARE    Drug use: No    Sexual activity: Not on file   Other Topics Concern    Not on file   Social History Narrative    Not on file      Social Determinants of Health      Financial Resource Strain: Not on file   Food Insecurity: Not on file   Transportation Needs: Not on file   Physical Activity: Not on file   Stress: Not on file   Social Connections: Not on file   Intimate Partner Violence: Not on file   Housing Stability: Not on file            REVIEW OF SYSTEMS: A 12-point review of systemswas obtained and pertinent positives and negatives were enumerated above in the history of present illness. All other reviewed systems / symptoms were negative. Review of Systems   Constitutional:  Negative for appetite change, fatigue and unexpected weight change. HENT:  Negative for trouble swallowing. Respiratory:  Negative for cough, choking, shortness of breath and wheezing. Cardiovascular:  Negative for chest pain, palpitations and leg swelling. Gastrointestinal:  Positive for constipation and diarrhea. Negative for abdominal distention, abdominal pain, anal bleeding, blood in stool, nausea, rectal pain and vomiting. Genitourinary:  Negative for difficulty urinating. Allergic/Immunologic: Negative for environmental allergies and food allergies. Neurological:  Negative for dizziness, weakness, light-headedness, numbness and headaches. Hematological:  Does not bruise/bleed easily. Psychiatric/Behavioral:  Negative for sleep disturbance. The patient is not nervous/anxious.              LABORATORY DATA: Reviewed        Lab Results   Component Value Date     WBC 6.2 05/24/2022     HGB 14.9 05/24/2022     HCT 43.5 05/24/2022     MCV 89.2 05/24/2022      05/24/2022      05/24/2022     K 4.4 05/24/2022      05/24/2022     CO2 27 05/24/2022     BUN 17 05/24/2022     CREATININE 1.04 05/24/2022     LABALBU 4.6 05/24/2022     BILITOT 0.35 05/24/2022     ALKPHOS 52 05/24/2022     AST 26 05/24/2022     ALT 34 05/24/2022                  Lab Results Component Value Date     RBC 4.88 05/24/2022     HGB 14.9 05/24/2022     MCV 89.2 05/24/2022     MCH 30.6 05/24/2022     MCHC 34.3 05/24/2022     RDW 13.6 05/24/2022     MPV 9.0 05/24/2022     BASOPCT 0 05/24/2022     LYMPHSABS 1.40 05/24/2022     MONOSABS 0.50 05/24/2022     NEUTROABS 4.00 05/24/2022     EOSABS 0.20 05/24/2022     BASOSABS 0.00 05/24/2022            DIAGNOSTIC TESTING:      No results found. PHYSICAL EXAMINATION: Vital signs reviewed per the nursing documentation. /66   Pulse 84   Temp 97 °F (36.1 °C)   Ht 5' 10\" (1.778 m)   Wt 242 lb (109.8 kg)   BMI 34.72 kg/m²   Body mass index is 34.72 kg/m². Physical Exam  Vitals and nursing note reviewed. Constitutional:       General: He is not in acute distress. Appearance: He is well-developed. He is not diaphoretic. HENT:      Head: Normocephalic and atraumatic. Eyes:      General: No scleral icterus. Pupils: Pupils are equal, round, and reactive to light. Neck:      Thyroid: No thyromegaly. Vascular: No JVD. Trachea: No tracheal deviation. Cardiovascular:      Rate and Rhythm: Normal rate and regular rhythm. Heart sounds: Normal heart sounds. No murmur heard. Pulmonary:      Effort: Pulmonary effort is normal. No respiratory distress. Breath sounds: Normal breath sounds. No wheezing. Abdominal:      General: Bowel sounds are normal. There is no distension. Palpations: Abdomen is soft. There is no mass. Tenderness: There is no abdominal tenderness. There is no guarding or rebound. Musculoskeletal:         General: No tenderness. Normal range of motion. Cervical back: Normal range of motion and neck supple. Skin:     General: Skin is warm. Coloration: Skin is not pale. Findings: No erythema or rash. Comments: He is not diaphoretic   Neurological:      Mental Status: He is alert and oriented to person, place, and time.       Deep Tendon Reflexes: Reflexes are normal and symmetric. Psychiatric:         Behavior: Behavior normal.         Thought Content: Thought content normal.         Judgment: Judgment normal.            IMPRESSION: Mr. Roma Avalos is a 63 y.o. male with        Diagnosis Orders   1. Malignant neoplasm of descending colon (HCC)  COLONOSCOPY W/ OR W/O BIOPSY       2. Adenomatous polyp of sigmoid colon          3. Diverticulosis             Will proceed with the above  As mentioned his CEA and CAT scans are negative and is managing his diarrhea with Imodium and his labs are all normal     Diet/life style/natural hx /complication of the dx were all explained in details   Past medical, past surgical, social history, psychiatric history, medications or allergies, all reviewed and  updated     Thank you for allowing me to participate in the care of Mr. Roma Avalos. For any further questions please do not hesitate to contact me. I have reviewed and agree with the ROS entered by the MA/RN. Note is dictated utilizing voice recognition software. Unfortunately this leads to occasional typographical errors. Please contact our office if you have any questions.         Cyrus Lopez MD  [facility]  O: #[phone]                       Revision History

## 2022-10-14 NOTE — ANESTHESIA POSTPROCEDURE EVALUATION
Department of Anesthesiology  Postprocedure Note    Patient: Nate Manriquez  MRN: [id number]  YOB: 1960  Date of evaluation: 10/14/2022      Procedure Summary     Date: 10/14/22 Room / Location: [place] [address] / [facility] - INPATIENT    Anesthesia Start: 1022 Anesthesia Stop: 1101    Procedure: COLONOSCOPY DIAGNOSTIC Diagnosis:       Malignant neoplasm of descending colon ([address]      (Malignant neoplasm of descending colon ([address] [C18.6])    Surgeons: Cynthia Valencia MD Responsible Provider: Christian De La Rosa MD    Anesthesia Type: MAC ASA Status: 3          Anesthesia Type: No value filed. Ella Phase I:      Ella Phase II: Ella Score: 10      Anesthesia Post Evaluation    Patient location during evaluation: PACU  Patient participation: complete - patient participated  Level of consciousness: awake  Airway patency: patent  Nausea & Vomiting: no nausea and no vomiting  Complications: no  Cardiovascular status: hemodynamically stable  Respiratory status: acceptable  Hydration status: stable  There was medical reason for not using a multimodal analgesia pain management approach.

## 2023-02-15 NOTE — PROGRESS NOTES
GI CLINIC FOLLOW UP    INTERVAL HISTORY:   No referring provider defined for this encounter. Chief Complaint   Patient presents with    Colon Cancer     Patient is f/u on colonoscopy for hx of colon cancer           HISTORY OF PRESENT ILLNESS: Caitlin De La Torre is a 63 y.o. male , referred for evaluation of  hx colon ca , diverticulosis , change in bowel ,polyps, diverticulosis    here for f/u   We have diagnosed him with colon cancer in 2016 his tumor was T1 a N0 M0 did not need any adjuvant therapy treated surgically,  Last visit colnosocpy repeated result as below   The patient does have chronic diarrhea for years  Any treatment for it make him having constipation for which he always like to control it on his own with prune juice when he is constipated, and when he is not he does not take anything  As mentioned in the previous note he did have   ct and CEA   Ct : negative   CEA normal   Labs 5/22: lft/cbc all normal     The prep was fair. Findings:  Terminal ileum: normal     Cecum/Ascending colon: normal     Transverse colon: normal     Descending/Sigmoid colon: Clean scar from the previous surgical anastomosis located at 12 cm from the anal verge      Rectum/Anus: examined in normal and retroflexed positions and was abnormal: Hemorrhoids     Withdrawal Time was (minutes): 12     The colon was decompressed and the scope was removed. The patient tolerated the procedure well. Recommendations/Plan:   Lifestyle and dietary modifications as discussed  F/U Biopsies  F/U In OfficeYes  Discussed with the family  Repeat colonoscopy [id number]     Electronically signed by Fuad Giron MD  on 10/14/2022 at 10:56 AM       Past Medical,Family, and Social History reviewed and does contribute to the patient presentingcondition. Patient's PMH/PSH,SH,PSYCH Hx, MEDs, ALLERGIES, and ROS were all reviewed and updated in the appropriate sections.     PAST MEDICAL HISTORY:  Past Medical History:   Diagnosis Date Arthritis     CAD (coronary artery disease)     Diabetes mellitus ([facility] [address]     H/O colon cancer, stage I     Hyperlipidemia     Hyperplastic polyp of intestine     Hypertension     Pneumonia     Polyp of colon, villous adenoma     WITH HIGH GRADE DYSPLASIA     Tubular adenoma        Past Surgical History:   Procedure Laterality Date    COLECTOMY  04/21/2015    LOWER ANTERIOR RESECTION, INTRAOP. COLONOSCOPY, DIVERTING LOOP COLOSTOMY    COLONOSCOPY  03/24/2015    VILLOUS ADENOMA WITH FOCAL HIGH GRADE DYSPLASIA, TUBULAR ADENOMA AND HYPERPLASTIC POLYP     COLONOSCOPY  06/23/2015    BENIGN SMALL INTESTINE AND CONTIGUOUS SKIN, CONSISTENT WITH ILEOTOMY     COLONOSCOPY  03/17/2016    DIVERTICULOSIS, HEMORRHOIDS     COLONOSCOPY  10/14/2022    COLONOSCOPY N/A 10/14/2022    COLONOSCOPY DIAGNOSTIC performed by Seamus Danielson MD at [facility]  2004 2009 2013     TOTAL OF 10 STENTS    OTHER SURGICAL HISTORY Left 2 YRS AGO    SHOULDER SURGERY    OTHER SURGICAL HISTORY  06/30/2015    ileostomy reversal    TN COLON CA SCRN NOT HI RSK IND N/A 10/18/2018    COLONOSCOPY performed by Seamus Danielson MD at [facility]  04/16/2015    tatooing    TESTICLE SURGERY Right     TIBIA FRACTURE SURGERY Right 01/01/1975    & FIB    TONSILLECTOMY      VASECTOMY  25YRS AGO       CURRENT MEDICATIONS:    Current Outpatient Medications:     dapagliflozin (FARXIGA) 5 MG tablet, 1 tablet, Disp: , Rfl:     metFORMIN (GLUCOPHAGE) 500 MG tablet, TAKE 1 TABLET BY MOUTH TWO TIMES A DAY, Disp: , Rfl: 1    loratadine (CLARITIN) 10 MG capsule, Take 10 mg by mouth daily, Disp: , Rfl:     ZETIA 10 MG tablet, , Disp: , Rfl:     rosuvastatin (CRESTOR) 40 MG tablet, Take 40 mg by mouth every evening., Disp: , Rfl:     metoprolol (TOPROL-XL) 50 MG XL tablet, Take 50 mg by mouth daily. , Disp: , Rfl:     lisinopril (PRINIVIL;ZESTRIL) 5 MG tablet, Take 5 mg by mouth daily. , Disp: , Rfl:     aspirin 81 MG tablet, Take 81 mg by mouth daily. , Disp: , Rfl:     ALLERGIES:   Allergies   Allergen Reactions    Iodine Rash     SEVERE ITCHING AND RASH- pt states he thinks he is ok with it 22       FAMILY HISTORY:       Problem Relation Age of Onset    Heart Attack Father     Hypertension Father     High Cholesterol Father     Hypertension Brother     Heart Attack Brother     Cancer Brother         skin cancer    Heart Attack Brother     Heart Attack Paternal Grandfather     Heart Attack Maternal Grandfather          SOCIAL HISTORY:   Social History     Socioeconomic History    Marital status:      Spouse name: Not on file    Number of children: Not on file    Years of education: Not on file    Highest education level: Not on file   Occupational History    Not on file   Tobacco Use    Smoking status: Former     Types: Cigarettes     Quit date: 2005     Years since quittin.8     Passive exposure: Past    Smokeless tobacco: Never   Vaping Use    Vaping Use: Never used   Substance and Sexual Activity    Alcohol use: Yes     Comment: RARE    Drug use: No    Sexual activity: Not on file   Other Topics Concern    Not on file   Social History Narrative    Not on file     Social Determinants of Health     Financial Resource Strain: Not on file   Food Insecurity: Not on file   Transportation Needs: Not on file   Physical Activity: Not on file   Stress: Not on file   Social Connections: Not on file   Intimate Partner Violence: Not on file   Housing Stability: Not on file       REVIEW OF SYSTEMS: A 12-point review of systemswas obtained and pertinent positives and negatives were enumerated above in the history of present illness. All other reviewed systems / symptoms were negative. Review of Systems   Constitutional:  Negative for appetite change, fatigue and unexpected weight change. HENT:  Negative for trouble swallowing. Eyes:  Positive for visual disturbance (glasses).    Respiratory:  Negative for cough, choking, shortness of breath and wheezing. Cardiovascular:  Negative for chest pain, palpitations and leg swelling. Gastrointestinal:  Positive for constipation and diarrhea. Negative for abdominal distention, abdominal pain, anal bleeding, blood in stool, nausea, rectal pain and vomiting. Genitourinary:  Negative for difficulty urinating. Allergic/Immunologic: Negative for environmental allergies and food allergies. Neurological:  Negative for dizziness, weakness, light-headedness, numbness and headaches. Hematological:  Does not bruise/bleed easily. Psychiatric/Behavioral:  Negative for sleep disturbance. The patient is not nervous/anxious. LABORATORY DATA: Reviewed  Lab Results   Component Value Date    WBC 6.2 05/24/2022    HGB 14.9 05/24/2022    HCT 43.5 05/24/2022    MCV 89.2 05/24/2022     05/24/2022     05/24/2022    K 4.4 05/24/2022     05/24/2022    CO2 27 05/24/2022    BUN 17 05/24/2022    CREATININE 1.04 05/24/2022    LABALBU 4.6 05/24/2022    BILITOT 0.35 05/24/2022    ALKPHOS 52 05/24/2022    AST 26 05/24/2022    ALT 34 05/24/2022         Lab Results   Component Value Date    RBC 4.88 05/24/2022    HGB 14.9 05/24/2022    MCV 89.2 05/24/2022    MCH 30.6 05/24/2022    MCHC 34.3 05/24/2022    RDW 13.6 05/24/2022    MPV 9.0 05/24/2022    BASOPCT 0 05/24/2022    LYMPHSABS 1.40 05/24/2022    MONOSABS 0.50 05/24/2022    NEUTROABS 4.00 05/24/2022    EOSABS 0.20 05/24/2022    BASOSABS 0.00 05/24/2022         DIAGNOSTIC TESTING:     No results found. PHYSICAL EXAMINATION: Vital signs reviewed per the nursing documentation. /80   Pulse 83   Temp 97.3 °F (36.3 °C)   Wt 239 lb (108.4 kg)   BMI 34.29 kg/m²   Body mass index is 34.29 kg/m². Physical Exam  Vitals and nursing note reviewed. Constitutional:       General: He is not in acute distress. Appearance: He is well-developed. He is not diaphoretic. HENT:      Head: Normocephalic and atraumatic. Eyes:      General: No scleral icterus. Pupils: Pupils are equal, round, and reactive to light. Neck:      Thyroid: No thyromegaly. Vascular: No JVD. Trachea: No tracheal deviation. Cardiovascular:      Rate and Rhythm: Normal rate and regular rhythm. Heart sounds: Normal heart sounds. No murmur heard. Pulmonary:      Effort: Pulmonary effort is normal. No respiratory distress. Breath sounds: Normal breath sounds. No wheezing. Abdominal:      General: Bowel sounds are normal. There is no distension. Palpations: Abdomen is soft. There is no mass. Tenderness: There is no abdominal tenderness. There is no guarding or rebound. Musculoskeletal:         General: No tenderness. Normal range of motion. Cervical back: Normal range of motion and neck supple. Skin:     General: Skin is warm. Coloration: Skin is not pale. Findings: No erythema or rash. Comments: He is not diaphoretic   Neurological:      Mental Status: He is alert and oriented to person, place, and time. Deep Tendon Reflexes: Reflexes are normal and symmetric. Psychiatric:         Behavior: Behavior normal.         Thought Content: Thought content normal.         Judgment: Judgment normal.         IMPRESSION: Mr. Vasques S [facility] is a 63 y.o. male with      Diagnosis Orders   1. Malignant neoplasm of descending colon ([address]        2. Adenomatous polyp of sigmoid colon        3. Diverticulosis        4. Diarrhea, unspecified type          We will continue to watch  As mentioned he wants to handle the diarrhea and is on because it does alternate with constipation sometimes and when he does he takes prune juice  For which he is reluctant to take any antidiarrhea medication because he knows he is going to be constipated.     As far as the colonoscopy was negative CAT scan and CEA are normal  We will continue to watch and repeat his scope in 3 years  Repeat his CEA and CAT scan yearly      Diet/life style/natural hx /complication of the dx were all explained in details   Past medical, past surgical, social history, psychiatric history, medications or allergies, all reviewed and  updated    Thank you for allowing me to participate in the care of Mr. Thelma Fontana. For any further questions please do not hesitate to contact me. I have reviewed and agree with the ROS entered by the MA/RN. Note is dictated utilizing voice recognition software. Unfortunately this leads to occasional typographical errors. Please contact our office if you have any questions.       Yolande Turner MD  [facility]  O: #[phone]

## 2023-02-21 ENCOUNTER — OFFICE VISIT (OUTPATIENT)
Dept: GASTROENTEROLOGY | Age: 63
End: 2023-02-21
Payer: COMMERCIAL

## 2023-02-21 VITALS
DIASTOLIC BLOOD PRESSURE: 80 MMHG | SYSTOLIC BLOOD PRESSURE: 119 MMHG | BODY MASS INDEX: 34.29 KG/M2 | HEART RATE: 83 BPM | TEMPERATURE: 97.3 F | WEIGHT: 239 LBS

## 2023-02-21 DIAGNOSIS — K57.90 DIVERTICULOSIS: ICD-10-CM

## 2023-02-21 DIAGNOSIS — R19.7 DIARRHEA, UNSPECIFIED TYPE: ICD-10-CM

## 2023-02-21 DIAGNOSIS — C18.6 MALIGNANT NEOPLASM OF DESCENDING COLON (HCC): Primary | ICD-10-CM

## 2023-02-21 DIAGNOSIS — D12.5 ADENOMATOUS POLYP OF SIGMOID COLON: ICD-10-CM

## 2023-02-21 PROCEDURE — 99213 OFFICE O/P EST LOW 20 MIN: CPT | Performed by: INTERNAL MEDICINE

## 2023-02-21 PROCEDURE — 3079F DIAST BP 80-89 MM HG: CPT | Performed by: INTERNAL MEDICINE

## 2023-02-21 PROCEDURE — 3074F SYST BP LT 130 MM HG: CPT | Performed by: INTERNAL MEDICINE

## 2023-02-21 ASSESSMENT — ENCOUNTER SYMPTOMS
ABDOMINAL DISTENTION: 0
CONSTIPATION: 1
WHEEZING: 0
TROUBLE SWALLOWING: 0
ABDOMINAL PAIN: 0
VOMITING: 0
ANAL BLEEDING: 0
NAUSEA: 0
DIARRHEA: 1
SHORTNESS OF BREATH: 0
RECTAL PAIN: 0
CHOKING: 0
COUGH: 0
BLOOD IN STOOL: 0

## 2023-02-22 ENCOUNTER — HOSPITAL ENCOUNTER (OUTPATIENT)
Age: 63
Setting detail: SPECIMEN
Discharge: HOME | End: 2023-02-22
Payer: COMMERCIAL

## 2023-02-22 LAB
ABSOLUTE EOS #: 0.08 K/UL (ref 0–0.44)
ABSOLUTE IMMATURE GRANULOCYTE: 0.03 K/UL (ref 0–0.3)
ABSOLUTE LYMPH #: 1.34 K/UL (ref 1.1–3.7)
ABSOLUTE MONO #: 0.41 K/UL (ref 0.1–1.2)
ALBUMIN SERPL-MCNC: 4.5 G/DL (ref 3.5–5.2)
ALBUMIN/GLOBULIN RATIO: 1.9 (ref 1–2.5)
ALP SERPL-CCNC: 58 U/L (ref 40–129)
ALT SERPL-CCNC: 37 U/L (ref 5–41)
ANION GAP SERPL CALCULATED.3IONS-SCNC: 16 MMOL/L (ref 9–17)
AST SERPL-CCNC: 41 U/L
BASOPHILS # BLD: 1 % (ref 0–2)
BASOPHILS ABSOLUTE: 0.03 K/UL (ref 0–0.2)
BILIRUB SERPL-MCNC: 0.3 MG/DL (ref 0.3–1.2)
BUN SERPL-MCNC: 20 MG/DL (ref 8–23)
CALCIUM SERPL-MCNC: 9 MG/DL (ref 8.6–10.4)
CHLORIDE SERPL-SCNC: 103 MMOL/L (ref 98–107)
CO2 SERPL-SCNC: 24 MMOL/L (ref 20–31)
CREAT SERPL-MCNC: 1.17 MG/DL (ref 0.7–1.2)
EOSINOPHILS RELATIVE PERCENT: 1 % (ref 1–4)
GFR SERPL CREATININE-BSD FRML MDRD: >60 ML/MIN/1.73M2
GLUCOSE SERPL-MCNC: 139 MG/DL (ref 70–99)
HCT VFR BLD AUTO: 44.2 % (ref 40.7–50.3)
HGB BLD-MCNC: 14.5 G/DL (ref 13–17)
IMMATURE GRANULOCYTES: 1 %
LYMPHOCYTES # BLD: 21 % (ref 24–43)
MCH RBC QN AUTO: 30.5 PG (ref 25.2–33.5)
MCHC RBC AUTO-ENTMCNC: 32.8 G/DL (ref 28.4–34.8)
MCV RBC AUTO: 92.9 FL (ref 82.6–102.9)
MONOCYTES # BLD: 6 % (ref 3–12)
NRBC AUTOMATED: 0 PER 100 WBC
PDW BLD-RTO: 13.1 % (ref 11.8–14.4)
PLATELET # BLD AUTO: 239 K/UL (ref 138–453)
PMV BLD AUTO: 12.2 FL (ref 8.1–13.5)
POTASSIUM SERPL-SCNC: 4.2 MMOL/L (ref 3.7–5.3)
PROT SERPL-MCNC: 6.9 G/DL (ref 6.4–8.3)
RBC # BLD: 4.76 M/UL (ref 4.21–5.77)
SEG NEUTROPHILS: 70 % (ref 36–65)
SEGMENTED NEUTROPHILS ABSOLUTE COUNT: 4.54 K/UL (ref 1.5–8.1)
SODIUM SERPL-SCNC: 143 MMOL/L (ref 135–144)
WBC # BLD AUTO: 6.4 K/UL (ref 3.5–11.3)

## 2023-02-22 PROCEDURE — 80053 COMPREHEN METABOLIC PANEL: CPT

## 2023-02-22 PROCEDURE — 36415 COLL VENOUS BLD VENIPUNCTURE: CPT

## 2023-02-22 PROCEDURE — 83036 HEMOGLOBIN GLYCOSYLATED A1C: CPT

## 2023-02-22 PROCEDURE — 85025 COMPLETE CBC W/AUTO DIFF WBC: CPT

## 2023-02-23 LAB
EST. AVERAGE GLUCOSE BLD GHB EST-MCNC: 137 MG/DL
HBA1C MFR BLD: 6.4 % (ref 4–6)

## (undated) DEVICE — Device: Brand: DEFENDO VALVE AND CONNECTOR KIT

## (undated) DEVICE — JELLY,LUBE,STERILE,FLIP TOP,TUBE,2-OZ: Brand: MEDLINE

## (undated) DEVICE — GOWN,POLY REINFORCED,LG: Brand: MEDLINE

## (undated) DEVICE — GAUZE,SPONGE,4"X4",16PLY,STRL,LF,10/TRAY: Brand: MEDLINE

## (undated) DEVICE — ADAPTER TBNG LUER STUB 15 GA INTMED

## (undated) DEVICE — YANKAUER,FLEXIBLE HANDLE,REGLR CAPACITY: Brand: MEDLINE INDUSTRIES, INC.

## (undated) DEVICE — GOWN,AURORA,NONREINFORCED,LARGE: Brand: MEDLINE

## (undated) DEVICE — SYRINGE MED 50ML LUERLOCK TIP

## (undated) DEVICE — MEDICINE CUP, GRADUATED, STER: Brand: MEDLINE

## (undated) DEVICE — CO2 CANNULA,SUPERSOFT, ADLT,7'O2,7'CO2: Brand: MEDLINE

## (undated) DEVICE — Z DISCONTINUED USE 2424143 ADAPTER O2 SWVL CHRISTMAS TREE GRN

## (undated) DEVICE — Z DUPLICATE USE 2527422 TUBING O2 STD 7 FT EXTN NO CRUSH VISUAL CNTRST LF

## (undated) DEVICE — BASIN EMSIS 700ML GRAPHITE PLAS KID SHP GRAD

## (undated) DEVICE — Z INACTIVE USE 2525529 CONNECTOR TBNG FOR O2

## (undated) DEVICE — DEFENDO AIR WATER SUCTION AND BIOPSY VALVE KIT FOR  OLYMPUS: Brand: DEFENDO AIR/WATER/SUCTION AND BIOPSY VALVE

## (undated) DEVICE — TUBING, SUCTION, 1/4" X 12', STRAIGHT: Brand: MEDLINE

## (undated) DEVICE — CANNULA NSL AD TBNG L7FT PVC STR NONFLARED PRNG O2 DEL W STD